# Patient Record
Sex: MALE | Race: WHITE | Employment: FULL TIME | URBAN - METROPOLITAN AREA
[De-identification: names, ages, dates, MRNs, and addresses within clinical notes are randomized per-mention and may not be internally consistent; named-entity substitution may affect disease eponyms.]

---

## 2021-05-12 ENCOUNTER — APPOINTMENT (OUTPATIENT)
Dept: GENERAL RADIOLOGY | Age: 51
End: 2021-05-12
Payer: COMMERCIAL

## 2021-05-12 ENCOUNTER — HOSPITAL ENCOUNTER (OUTPATIENT)
Age: 51
Setting detail: OBSERVATION
Discharge: HOME | End: 2021-05-13
Attending: EMERGENCY MEDICINE | Admitting: INTERNAL MEDICINE
Payer: COMMERCIAL

## 2021-05-12 DIAGNOSIS — R07.9 CHEST PAIN, UNSPECIFIED TYPE: Primary | ICD-10-CM

## 2021-05-12 DIAGNOSIS — R51.9 NONINTRACTABLE HEADACHE, UNSPECIFIED CHRONICITY PATTERN, UNSPECIFIED HEADACHE TYPE: ICD-10-CM

## 2021-05-12 DIAGNOSIS — R06.02 SHORTNESS OF BREATH: ICD-10-CM

## 2021-05-12 PROBLEM — E66.9 OBESITY (BMI 30-39.9): Status: ACTIVE | Noted: 2021-05-12

## 2021-05-12 LAB
A/G RATIO: 1.1 (ref 1.1–2.2)
ALBUMIN SERPL-MCNC: 3.9 G/DL (ref 3.4–5)
ALP BLD-CCNC: 66 U/L (ref 40–129)
ALT SERPL-CCNC: 25 U/L (ref 10–40)
ANION GAP SERPL CALCULATED.3IONS-SCNC: 8 MMOL/L (ref 3–16)
AST SERPL-CCNC: 24 U/L (ref 15–37)
BASOPHILS ABSOLUTE: 0.1 K/UL (ref 0–0.2)
BASOPHILS RELATIVE PERCENT: 0.6 %
BILIRUB SERPL-MCNC: 0.5 MG/DL (ref 0–1)
BUN BLDV-MCNC: 11 MG/DL (ref 7–20)
CALCIUM SERPL-MCNC: 9.1 MG/DL (ref 8.3–10.6)
CHLORIDE BLD-SCNC: 101 MMOL/L (ref 99–110)
CO2: 28 MMOL/L (ref 21–32)
CREAT SERPL-MCNC: 0.8 MG/DL (ref 0.9–1.3)
EKG ATRIAL RATE: 76 BPM
EKG DIAGNOSIS: NORMAL
EKG P AXIS: 51 DEGREES
EKG P-R INTERVAL: 158 MS
EKG Q-T INTERVAL: 370 MS
EKG QRS DURATION: 98 MS
EKG QTC CALCULATION (BAZETT): 416 MS
EKG R AXIS: -24 DEGREES
EKG T AXIS: 36 DEGREES
EKG VENTRICULAR RATE: 76 BPM
EOSINOPHILS ABSOLUTE: 0.2 K/UL (ref 0–0.6)
EOSINOPHILS RELATIVE PERCENT: 2.6 %
GFR AFRICAN AMERICAN: >60
GFR NON-AFRICAN AMERICAN: >60
GLOBULIN: 3.5 G/DL
GLUCOSE BLD-MCNC: 90 MG/DL (ref 70–99)
HCT VFR BLD CALC: 40 % (ref 40.5–52.5)
HEMOGLOBIN: 13.4 G/DL (ref 13.5–17.5)
LYMPHOCYTES ABSOLUTE: 3 K/UL (ref 1–5.1)
LYMPHOCYTES RELATIVE PERCENT: 33.3 %
MCH RBC QN AUTO: 29.2 PG (ref 26–34)
MCHC RBC AUTO-ENTMCNC: 33.5 G/DL (ref 31–36)
MCV RBC AUTO: 87.2 FL (ref 80–100)
MONOCYTES ABSOLUTE: 0.8 K/UL (ref 0–1.3)
MONOCYTES RELATIVE PERCENT: 9.1 %
NEUTROPHILS ABSOLUTE: 4.9 K/UL (ref 1.7–7.7)
NEUTROPHILS RELATIVE PERCENT: 54.4 %
PDW BLD-RTO: 13.4 % (ref 12.4–15.4)
PLATELET # BLD: 188 K/UL (ref 135–450)
PMV BLD AUTO: 10.8 FL (ref 5–10.5)
POTASSIUM SERPL-SCNC: 3.9 MMOL/L (ref 3.5–5.1)
RBC # BLD: 4.59 M/UL (ref 4.2–5.9)
SODIUM BLD-SCNC: 137 MMOL/L (ref 136–145)
TOTAL PROTEIN: 7.4 G/DL (ref 6.4–8.2)
TROPONIN: <0.01 NG/ML
TROPONIN: <0.01 NG/ML
WBC # BLD: 8.9 K/UL (ref 4–11)

## 2021-05-12 PROCEDURE — 84484 ASSAY OF TROPONIN QUANT: CPT

## 2021-05-12 PROCEDURE — G0378 HOSPITAL OBSERVATION PER HR: HCPCS

## 2021-05-12 PROCEDURE — 71045 X-RAY EXAM CHEST 1 VIEW: CPT

## 2021-05-12 PROCEDURE — 6370000000 HC RX 637 (ALT 250 FOR IP): Performed by: NURSE PRACTITIONER

## 2021-05-12 PROCEDURE — 99285 EMERGENCY DEPT VISIT HI MDM: CPT

## 2021-05-12 PROCEDURE — 6370000000 HC RX 637 (ALT 250 FOR IP): Performed by: INTERNAL MEDICINE

## 2021-05-12 PROCEDURE — 80053 COMPREHEN METABOLIC PANEL: CPT

## 2021-05-12 PROCEDURE — 93010 ELECTROCARDIOGRAM REPORT: CPT | Performed by: INTERNAL MEDICINE

## 2021-05-12 PROCEDURE — 93005 ELECTROCARDIOGRAM TRACING: CPT | Performed by: EMERGENCY MEDICINE

## 2021-05-12 PROCEDURE — 85025 COMPLETE CBC W/AUTO DIFF WBC: CPT

## 2021-05-12 RX ORDER — ONDANSETRON 2 MG/ML
4 INJECTION INTRAMUSCULAR; INTRAVENOUS EVERY 30 MIN PRN
Status: DISCONTINUED | OUTPATIENT
Start: 2021-05-12 | End: 2021-05-12

## 2021-05-12 RX ORDER — SODIUM CHLORIDE 9 MG/ML
25 INJECTION, SOLUTION INTRAVENOUS PRN
Status: DISCONTINUED | OUTPATIENT
Start: 2021-05-12 | End: 2021-05-13 | Stop reason: HOSPADM

## 2021-05-12 RX ORDER — ACETAMINOPHEN 500 MG
1000 TABLET ORAL ONCE
Status: COMPLETED | OUTPATIENT
Start: 2021-05-12 | End: 2021-05-12

## 2021-05-12 RX ORDER — POLYETHYLENE GLYCOL 3350 17 G/17G
17 POWDER, FOR SOLUTION ORAL DAILY PRN
Status: DISCONTINUED | OUTPATIENT
Start: 2021-05-12 | End: 2021-05-13 | Stop reason: HOSPADM

## 2021-05-12 RX ORDER — SODIUM CHLORIDE 0.9 % (FLUSH) 0.9 %
5-40 SYRINGE (ML) INJECTION EVERY 12 HOURS SCHEDULED
Status: DISCONTINUED | OUTPATIENT
Start: 2021-05-12 | End: 2021-05-13 | Stop reason: HOSPADM

## 2021-05-12 RX ORDER — ONDANSETRON 2 MG/ML
4 INJECTION INTRAMUSCULAR; INTRAVENOUS EVERY 6 HOURS PRN
Status: DISCONTINUED | OUTPATIENT
Start: 2021-05-12 | End: 2021-05-13 | Stop reason: HOSPADM

## 2021-05-12 RX ORDER — SODIUM CHLORIDE 0.9 % (FLUSH) 0.9 %
5-40 SYRINGE (ML) INJECTION PRN
Status: DISCONTINUED | OUTPATIENT
Start: 2021-05-12 | End: 2021-05-13 | Stop reason: HOSPADM

## 2021-05-12 RX ORDER — ACETAMINOPHEN 325 MG/1
650 TABLET ORAL EVERY 6 HOURS PRN
Status: DISCONTINUED | OUTPATIENT
Start: 2021-05-12 | End: 2021-05-13 | Stop reason: HOSPADM

## 2021-05-12 RX ORDER — LISINOPRIL 10 MG/1
10 TABLET ORAL DAILY
Status: DISCONTINUED | OUTPATIENT
Start: 2021-05-13 | End: 2021-05-13 | Stop reason: HOSPADM

## 2021-05-12 RX ORDER — NITROGLYCERIN 0.4 MG/1
0.4 TABLET SUBLINGUAL EVERY 5 MIN PRN
Status: DISCONTINUED | OUTPATIENT
Start: 2021-05-12 | End: 2021-05-13 | Stop reason: HOSPADM

## 2021-05-12 RX ORDER — PROMETHAZINE HYDROCHLORIDE 25 MG/1
12.5 TABLET ORAL EVERY 6 HOURS PRN
Status: DISCONTINUED | OUTPATIENT
Start: 2021-05-12 | End: 2021-05-13 | Stop reason: HOSPADM

## 2021-05-12 RX ORDER — ACETAMINOPHEN 650 MG/1
650 SUPPOSITORY RECTAL EVERY 6 HOURS PRN
Status: DISCONTINUED | OUTPATIENT
Start: 2021-05-12 | End: 2021-05-13 | Stop reason: HOSPADM

## 2021-05-12 RX ORDER — ASPIRIN 81 MG/1
81 TABLET ORAL DAILY
Status: DISCONTINUED | OUTPATIENT
Start: 2021-05-13 | End: 2021-05-13 | Stop reason: HOSPADM

## 2021-05-12 RX ORDER — PRAVASTATIN SODIUM 20 MG
20 TABLET ORAL DAILY
COMMUNITY

## 2021-05-12 RX ORDER — ATORVASTATIN CALCIUM 40 MG/1
40 TABLET, FILM COATED ORAL NIGHTLY
Status: DISCONTINUED | OUTPATIENT
Start: 2021-05-12 | End: 2021-05-13 | Stop reason: HOSPADM

## 2021-05-12 RX ADMIN — ACETAMINOPHEN 650 MG: 325 TABLET ORAL at 23:19

## 2021-05-12 RX ADMIN — NITROGLYCERIN 1 INCH: 20 OINTMENT TOPICAL at 17:40

## 2021-05-12 RX ADMIN — ACETAMINOPHEN 1000 MG: 500 TABLET ORAL at 17:40

## 2021-05-12 RX ADMIN — ATORVASTATIN CALCIUM 40 MG: 40 TABLET, FILM COATED ORAL at 21:43

## 2021-05-12 ASSESSMENT — PAIN DESCRIPTION - ORIENTATION: ORIENTATION: MID

## 2021-05-12 ASSESSMENT — PAIN DESCRIPTION - DESCRIPTORS: DESCRIPTORS: PRESSURE

## 2021-05-12 ASSESSMENT — PAIN DESCRIPTION - LOCATION: LOCATION: CHEST

## 2021-05-12 ASSESSMENT — PAIN SCALES - GENERAL
PAINLEVEL_OUTOF10: 0
PAINLEVEL_OUTOF10: 2

## 2021-05-12 ASSESSMENT — PAIN DESCRIPTION - PAIN TYPE: TYPE: ACUTE PAIN

## 2021-05-12 NOTE — ED PROVIDER NOTES
Evaluated by [id number] [facility] Provider    [address] [facility]  ED    CHIEF COMPLAINT  Chest Pain (patient states that was having some chest pain, mid sternal with radiating into the back. Patient was 6/10, now 2/10 after 2 nitro and 3 81mg aspirin (pt takes 81mg daily). Pt with hx of HTN)    HISTORY OF PRESENT ILLNESS  Amrik Pichardo is a 54 y.o. male who presents to the ED complaining of CP. Center to the left, radiated in to the back. HA, /72. EKG showed ST depression and some elevations, repeat EKG still showing changes and advised to come to ER. Had a heart cath about 11-12 years ago-enlarged heart. Has follow up with cardiology in 2 weeks because he is having BP issues again, he is on lisinopril again. Today there was a code on a OD at 5:45 am, after this is when he noticed he wasn't feeling good. Approximate onset of pain was around 10:30 am. He felt wore out and weak at first. Pain came out of the blue, not to sudden. He tried to eat, not feeling good and not getting any better. Checked BP and it was 170/102. He did have a dull ache in his left shoulder at one point. No further radiation of the pain. Reports associated SOB, little light headed/dizzy, HA, balance off a little. Denies associated nausea, vomiting, diaphoresis. Pain has not gone completely away since it started. Prior to coming here it was up to a 8/10. Denies aggravating factors, denies alleviating factors for the pain. The patient is currently rating their pain as 2/10 and describes it as a dull discomfort type of pain. Risk factors include: Possible mild MI about 13 years, cath showed no blockages but enlarged heart. No further testing since then. +Obesity, +HTN, +HLD, -DM, -smoker, -FH. The patient denies history of a blood clot in the past.   The patient denies history of surgery, travel, or trauma in the past 4 weeks. The patient denies cough. The patient does not take estrogen supplements.    There is no unilateral leg swelling. Treatments tried prior to arrival in the ED include: 2 NTG decreased pain from 6 to a 2 and helped his BP. He did take 1 81 mg ASA this morning and was given 3 more in the squad. The patient arrived to the ED via EMS transport. PAST MEDICAL HISTORY    Past Medical History:   Diagnosis Date    Enlarged heart     by cath 2011//dur to atenolol 25???//    Hypertension        SURGICAL HISTORY    Past Surgical History:   Procedure Laterality Date    HERNIA REPAIR      SHOULDER ARTHROSCOPY         CURRENT MEDICATIONS    Current Outpatient Rx   Medication Sig Dispense Refill    ibuprofen (ADVIL;MOTRIN) 600 MG tablet Take 600 mg by mouth every 6 hours as needed for Pain.  Naproxen Sodium (ALEVE) 220 MG CAPS Take 220 mg by mouth.  lisinopril (PRINIVIL;ZESTRIL) 10 MG tablet Take 1 tablet by mouth daily. 30 tablet 5    nitroGLYCERIN (NITROSTAT) 0.4 MG SL tablet Place 1 tablet under the tongue every 5 minutes as needed for Chest pain (may repeat 2 times only). 25 tablet 3    aspirin EC 81 MG EC tablet Take 1 tablet by mouth daily. 30 tablet 3       ALLERGIES    Allergies   Allergen Reactions    Atenolol Other (See Comments)     Enlarged heart//????    Hctz [Hydrochlorothiazide] Other (See Comments)     Enlarged heart with bp med//2011???       FAMILY HISTORY    No family history on file.     SOCIAL HISTORY    Social History     Socioeconomic History    Marital status:      Spouse name: Not on file    Number of children: Not on file    Years of education: Not on file    Highest education level: Not on file   Occupational History    Not on file   Social Needs    Financial resource strain: Not on file    Food insecurity     Worry: Not on file     Inability: Not on file    Transportation needs     Medical: Not on file     Non-medical: Not on file   Tobacco Use    Smoking status: Never Smoker    Smokeless tobacco: Never Used   Substance and Sexual Activity    Alcohol use: Yes     Comment: occasional, Very rare.  Drug use: No    Sexual activity: Not on file   Lifestyle    Physical activity     Days per week: Not on file     Minutes per session: Not on file    Stress: Not on file   Relationships    Social connections     Talks on phone: Not on file     Gets together: Not on file     Attends Buddhism service: Not on file     Active member of club or organization: Not on file     Attends meetings of clubs or organizations: Not on file     Relationship status: Not on file    Intimate partner violence     Fear of current or ex partner: Not on file     Emotionally abused: Not on file     Physically abused: Not on file     Forced sexual activity: Not on file   Other Topics Concern    Not on file   Social History Narrative    2/2013 lives with edna and his mother;works [employer];       REVIEW OF SYSTEMS    10 systems reviewed, pertinent positives per HPI otherwise noted to be negative    PHYSICAL EXAM  Vitals:    05/12/21 1630   BP: 136/86   Pulse: 81   Resp: 14   Temp: 98.2 °F (36.8 °C)   SpO2: 97%     GENERAL: Patient is well-developed, obese. Awake and alert. Cooperative. Resting in bed. No apparent distress. HEENT:  Normocephalic, atraumatic. Conjunctiva appear normal. Sclera is non-icteric. External ears are normal.    NECK: Supple with normal ROM. Trachea midline. LUNGS: Equal and symmetric chest rise. Breathing is unlabored. Speaking comfortably in full sentences. Lungs are clear bilaterally to auscultation. Without wheezing, rales, or rhonchi. CADIOVASCULAR:  Regular rate and rhythm. Normal S1-S2 sounds. No murmurs, rubs, or gallops. Capillary refill is brisk in all 4 extremities. Bilateral lower extremities are equal in size, there is no swelling observed. There is no tenderness to palpation. There is no erythema observed or warmth palpated. GI: Soft, nontender, nondistended with positive bowel sounds.   No rebound tenderness, guarding or any peritoneal signs. No masses or hepatosplenomegaly    MUSCULOSKELETAL:  No gross deformities or trauma noted. Moving all extremities equally and appropriately. Normal ROM. SKIN: Warm/dry. Skin is intact. No rashes/lesions noted. PSYCHIATRIC: Mood and affect appropriate. Speech is clear and articulate. NEUROLOGIC: Alert and oriented. No focal motor or sensory deficits. LABS  I have reviewed all labs for this visit.    Results for orders placed or performed during the hospital encounter of 05/12/21   CBC auto differential   Result Value Ref Range    WBC 8.9 4.0 - 11.0 K/uL    RBC 4.59 4.20 - 5.90 M/uL    Hemoglobin 13.4 (L) 13.5 - 17.5 g/dL    Hematocrit 40.0 (L) 40.5 - 52.5 %    MCV 87.2 80.0 - 100.0 fL    MCH 29.2 26.0 - 34.0 pg    MCHC 33.5 31.0 - 36.0 g/dL    RDW 13.4 12.4 - 15.4 %    Platelets 050 180 - 049 K/uL    MPV 10.8 (H) 5.0 - 10.5 fL    Neutrophils % 54.4 %    Lymphocytes % 33.3 %    Monocytes % 9.1 %    Eosinophils % 2.6 %    Basophils % 0.6 %    Neutrophils Absolute 4.9 1.7 - 7.7 K/uL    Lymphocytes Absolute 3.0 1.0 - 5.1 K/uL    Monocytes Absolute 0.8 0.0 - 1.3 K/uL    Eosinophils Absolute 0.2 0.0 - 0.6 K/uL    Basophils Absolute 0.1 0.0 - 0.2 K/uL   Comprehensive metabolic panel   Result Value Ref Range    Sodium 137 136 - 145 mmol/L    Potassium 3.9 3.5 - 5.1 mmol/L    Chloride 101 99 - 110 mmol/L    CO2 28 21 - 32 mmol/L    Anion Gap 8 3 - 16    Glucose 90 70 - 99 mg/dL    BUN 11 7 - 20 mg/dL    CREATININE 0.8 (L) 0.9 - 1.3 mg/dL    GFR Non-African American >60 >60    GFR African American >60 >60    Calcium 9.1 8.3 - 10.6 mg/dL    Total Protein 7.4 6.4 - 8.2 g/dL    Albumin 3.9 3.4 - 5.0 g/dL    Albumin/Globulin Ratio 1.1 1.1 - 2.2    Total Bilirubin 0.5 0.0 - 1.0 mg/dL    Alkaline Phosphatase 66 40 - 129 U/L    ALT 25 10 - 40 U/L    AST 24 15 - 37 U/L    Globulin 3.5 g/dL   Troponin   Result Value Ref Range    Troponin <0.01 <0.01 ng/mL   EKG 12 Lead   Result Value Ref Range    Ventricular Rate 76 BPM    Atrial Rate 76 BPM    P-R Interval 158 ms    QRS Duration 98 ms    Q-T Interval 370 ms    QTc Calculation (Bazett) 416 ms    P Axis 51 degrees    R Axis -24 degrees    T Axis 36 degrees    Diagnosis       Normal sinus rhythmMinimal voltage criteria for LVH, may be normal variantBorderline ECGWhen compared with ECG of 08-NOV-2013 14:43,No significant change was found       RADIOLOGY    Xr Chest Portable    Result Date: 5/12/2021  EXAMINATION: ONE XRAY VIEW OF THE CHEST 5/12/2021 4:46 pm COMPARISON: 11/08/2013 HISTORY: ORDERING SYSTEM PROVIDED HISTORY: chest pain TECHNOLOGIST PROVIDED HISTORY: Reason for exam:->chest pain Reason for Exam: cp sob Acuity: Acute Type of Exam: Initial FINDINGS: Lung volumes are low accentuating heart size and bronchovascular markings at the lung bases. Patient body habitus limits evaluation of fine pulmonary parenchymal changes. Heart size is normal.  Mediastinal contours are normal. Pulmonary vascularity is normal.  No focal lung consolidation. No acute disease     PERC Rule:  Applicable in this patient who has low clinical suspicion for pulmonary embolism. Age < 48years old: Yes  Heart rate < 100 bpm: Yes  Oxygen saturation > 95%: Yes  Hemoptysis: No  Exogenous estrogen use: No  Prior history of DVT or PE: No  Unilateral leg swelling: No  Surgery or significant trauma in the past 4 weeks: No    Based on the above, PE can effectively be ruled out without further testing. PERC RuleThe patient meets all PERC criteria and has a low-risk Well's score, indicating very low risk of PE. The risks of further investigation, including a large dose of radiation and/or unnecessary treatment with anticoagulant therapy, outweigh the risk of a clinically significant PE. Thus, neither a D-dimer nor a CTA of the pulmonary arteries are indicated.     HEART SCORE:    History: +1 for moderate suspicion  EKG: +1 for nonspecific changes   Age: +1 for age 44-72 years  Risk factors (includes HLD, HTN, DM, tobacco use, obesity, and +FHx): +2 for known CAD or 3+ risk factors  Initial troponin: +0 for negative troponin    Heart score: 5. This falls under the following category: Score of 4-6, which indicates low/moderate risk for major adverse cardiac event and supports observation with repeated troponins and/or non-invasive testing  Score 4-6 12-16.6% risk of MACE. In the HEART Score study, these patients were admitted to hospital.     ED COURSE/MDM  Patient seen and evaluated. Old records reviewed. Diagnostic testing reviewed and results discussed. I have seen and evaluated this patient with supervising physician: Prasanth Rogers MD. We thoroughly discussed the history, physical exam, diagnostic testing, emergency department course, plan and disposition. Coco Wilson presented to the ED today with above noted complaints. Physical exam without adventitious breath sounds on exam.  Patient is afebrile and hemodynamically stable. He is well saturated on room air. He is continuing to report chest pain of a 2/10 that is a discomfort. Patient has taken a total of 324 mg of aspirin prior to arriving in the ER and was given 2 nitro. I will apply Nitropaste to the chest wall to see if this will help further diminish his pain. Blood work is without evidence of systemic infection. Stable anemia. No electrolyte abnormality. No evidence of acute kidney injury or transaminitis. Troponin is negative. Chest x-ray is without acute findings. EKG is without acute findings, minimal voltage criteria for LVH may be normal variant. Borderline EKG when compared with prior. Patient has a heart score of 5 and I do feel his story is concerning for a possible ACS and I feel he will require admission for further evaluation and management.     Pt was given the following medications or treatments in the ED:   Medications   ondansetron (ZOFRAN) injection 4 mg (has no administration in time range)   nitroglycerin (NITRO-BID) 2 % ointment 1 inch (1 inch Topical Given 5/12/21 1740)   acetaminophen (TYLENOL) tablet 1,000 mg (1,000 mg Oral Given 5/12/21 1740)     Donnie Rios will be admitted for further observation and evaluation of Aly Batres's chest pain. As I have deemed necessary from their history, physical, and studies, I have considered and evaluated Donnie Rios for the following diagnoses:  ACUTE CORONARY SYNDROME, PERICARDIAL TAMPONADE, PNEUMOTHORAX, PULMONARY EMBOLISM, and THORACIC DISSECTION. CLINICAL IMPRESSION    1. Chest pain, unspecified type    2. Nonintractable headache, unspecified chronicity pattern, unspecified headache type    3. Shortness of breath       DISPOSITION  Admit. Comment: Please note this report has been produced using speech recognition software and may contain errors related to that system including errors in grammar, punctuation, and spelling, as well as words and phrases that may be inappropriate. If there are any questions or concerns please feel free to contact the dictating provider for clarification.         PAULINE Bob - MELO  05/12/21 3722

## 2021-05-12 NOTE — ED PROVIDER NOTES
EKG interpretation:  Normal sinus rhythm, rate 76, left axis deviation, QTC within normal limits, no acute ST segment changes or T wave inversions compared with prior dated 11/8/2013     Scar Smiley MD  05/12/21 1815    I independently performed a history and physical on Lapatriciaie Feast. All diagnostic, treatment, and disposition decisions were made by myself in conjunction with the advanced practice provider. Initial work-up unremarkable however patient with a story suggestive of exertional chest pain with multiple risk factors and a heart score of 5. No recent cardiac work-up. Plan for hospitalist admission for stress test and echo. For further details of [facility] emergency department encounter, please see NILDA Landaverde's documentation.      Scar Smiley MD  05/12/21 2025

## 2021-05-12 NOTE — H&P
Hospital Medicine History & Physical      Patient:  Anastacio Ramirez  :   1970  MRN:   [id number]  Date of Service: 21    CHIEF COMPLAINT:  Chest Pain (patient states that was having some chest pain, mid sternal with radiating into the back. Patient was 6/10, now 2/10 after 2 nitro and 3 81mg aspirin (pt takes 81mg daily). Pt with hx of HTN)     HISTORY OF PRESENT ILLNESS:   Anastacio Ramirez is a 54 y.o. male. Patient is a basic EMT. Today there was a code on a OD at 5:45 am, after this around 9-10am he began feeling generally unwell and also describes feeling a \"weight\" sensation on his chest.  He also developed a headache. He thought maybe he felt poorly because he had not yet eaten, so he did go to eat but this didn't change his symptoms. Thereafter he went to a fire station, had a BP check and EKG. He was found to be unusually hypertensive with SBP in the 170s-180s. He relates there were EKG abnormalities but can't specifically tell about these in detail. On the way to the ER he was given three SL NTG in series and each one reduced the discomfort within 2 minutes but pain had not completely resolved at the time of arrival.  Nitropaste was thus applied in the ER.    ~11 years ago he was evaluated for severe chest pain in [place], [place]. He underwent LHC. His coronaries were normal but he was told his heart was enlarged. Review of Systems:  All pertinent positives and negatives are as noted in the HPI section. All other systems were reviewed and are negative. Past Medical History:   Diagnosis Date    Enlarged heart     by cath //dur to atenolol 25???//    Hypertension        Past Surgical History:   Procedure Laterality Date    HERNIA REPAIR      SHOULDER ARTHROSCOPY           Prior to Admission medications    Medication Sig Start Date End Date Taking? Authorizing Provider   ibuprofen (ADVIL;MOTRIN) 600 MG tablet Take 600 mg by mouth every 6 hours as needed for Pain. Yes Historical Provider, MD   Naproxen Sodium (ALEVE) 220 MG CAPS Take 220 mg by mouth. Yes Historical Provider, MD   lisinopril (PRINIVIL;ZESTRIL) 10 MG tablet Take 1 tablet by mouth daily. 6/5/13  Yes Michelle Cardoza MD   nitroGLYCERIN (NITROSTAT) 0.4 MG SL tablet Place 1 tablet under the tongue every 5 minutes as needed for Chest pain (may repeat 2 times only). 2/19/13  Yes Michelle Cardoza MD   aspirin EC 81 MG EC tablet Take 1 tablet by mouth daily. 2/19/13  Yes Michelle Cardoza MD       Allergies:   Atenolol and Hctz [hydrochlorothiazide]    Social:   reports that he has never smoked. He has never used smokeless tobacco.   reports current alcohol use. Social History     Substance and Sexual Activity   Drug Use No       FH  No 1st degree relatives with early MI or SCD    PHYSICAL EXAM:  I performed this physical examination. Vitals:  Patient Vitals for the past 24 hrs:   BP Temp Temp src Pulse Resp SpO2 Height Weight   05/12/21 1915 125/83 -- -- 76 19 94 % -- --   05/12/21 1846 134/78 -- -- 68 16 96 % -- --   05/12/21 1816 126/82 -- -- 76 13 95 % -- --   05/12/21 1746 (!) 147/88 -- -- 77 26 93 % -- --   05/12/21 1739 (!) 151/101 -- -- 75 16 98 % -- --   05/12/21 1630 136/86 98.2 °F (36.8 °C) Oral 81 14 97 % 5' 10\" (1.778 m) 269 lb (122 kg)     GEN:  Appearance:  Age appropriate male in NAD . Level of Consciousness:  alert . Orientation:  full    HEENT: Sclera anicteric.  no conjunctival chemosis. moist mucus membranes. no specific or diagnostic oral lesions. NECK:  no signs of meningismus. Jugular veins not distended. Carotid pulses  2+.  no cervical lymphadenopathy. no thyromegaly. CV:  regular rhythm. normal S1 & S2.    no murmur. no rub.  no gallop. PULM:  Chest excursion is symmetric. Breath sounds are vesicular. Adventitious sounds:  none    AB:  Abdominal shape is normal.  Bowel sounds are active. Generally soft to palpation. no tenderness is present. 05/12/2021    Hypertension [I10]        ASSESSMENT & PLAN  Chest Pain  -  1-2 features of typical cardiac angina in a male age 50. Intermediate pre-test probability for high-risk CAD. Will proceed with exercise/MPI stress test in the morning assuming overnight troponins remain undetectable. -  Continue home ASA 81mg daily. Start atorvastatin tonight. HTN  -  Continue lisinopril. DVT prophylaxis: SCDs, lovenox  Code Status:  Full  Disposition:  Observation. Anticipate d/c to home in 1-2 days.     Renu Alicia MD

## 2021-05-13 ENCOUNTER — APPOINTMENT (OUTPATIENT)
Dept: NUCLEAR MEDICINE | Age: 51
End: 2021-05-13
Payer: COMMERCIAL

## 2021-05-13 VITALS
HEART RATE: 75 BPM | SYSTOLIC BLOOD PRESSURE: 153 MMHG | OXYGEN SATURATION: 98 % | HEIGHT: 70 IN | WEIGHT: 269 LBS | RESPIRATION RATE: 18 BRPM | DIASTOLIC BLOOD PRESSURE: 90 MMHG | TEMPERATURE: 97.8 F | BODY MASS INDEX: 38.51 KG/M2

## 2021-05-13 LAB
ANION GAP SERPL CALCULATED.3IONS-SCNC: 6 MMOL/L (ref 3–16)
BUN BLDV-MCNC: 12 MG/DL (ref 7–20)
CALCIUM SERPL-MCNC: 9 MG/DL (ref 8.3–10.6)
CHLORIDE BLD-SCNC: 104 MMOL/L (ref 99–110)
CHOLESTEROL, TOTAL: 167 MG/DL (ref 0–199)
CO2: 28 MMOL/L (ref 21–32)
CREAT SERPL-MCNC: 0.8 MG/DL (ref 0.9–1.3)
EKG ATRIAL RATE: 59 BPM
EKG DIAGNOSIS: NORMAL
EKG P AXIS: 59 DEGREES
EKG P-R INTERVAL: 156 MS
EKG Q-T INTERVAL: 414 MS
EKG QRS DURATION: 96 MS
EKG QTC CALCULATION (BAZETT): 409 MS
EKG R AXIS: -16 DEGREES
EKG T AXIS: 19 DEGREES
EKG VENTRICULAR RATE: 59 BPM
ESTIMATED AVERAGE GLUCOSE: 108.3 MG/DL
GFR AFRICAN AMERICAN: >60
GFR NON-AFRICAN AMERICAN: >60
GLUCOSE BLD-MCNC: 95 MG/DL (ref 70–99)
HBA1C MFR BLD: 5.4 %
HCT VFR BLD CALC: 39.8 % (ref 40.5–52.5)
HDLC SERPL-MCNC: 31 MG/DL (ref 40–60)
HEMOGLOBIN: 13.2 G/DL (ref 13.5–17.5)
LDL CHOLESTEROL CALCULATED: 97 MG/DL
LV EF: 63 %
LVEF MODALITY: NORMAL
MCH RBC QN AUTO: 28.9 PG (ref 26–34)
MCHC RBC AUTO-ENTMCNC: 33.1 G/DL (ref 31–36)
MCV RBC AUTO: 87.3 FL (ref 80–100)
PDW BLD-RTO: 13.3 % (ref 12.4–15.4)
PLATELET # BLD: 174 K/UL (ref 135–450)
PMV BLD AUTO: 10.3 FL (ref 5–10.5)
POTASSIUM REFLEX MAGNESIUM: 4 MMOL/L (ref 3.5–5.1)
RBC # BLD: 4.56 M/UL (ref 4.2–5.9)
SODIUM BLD-SCNC: 138 MMOL/L (ref 136–145)
TRIGL SERPL-MCNC: 194 MG/DL (ref 0–150)
TROPONIN: <0.01 NG/ML
VLDLC SERPL CALC-MCNC: 39 MG/DL
WBC # BLD: 9.7 K/UL (ref 4–11)

## 2021-05-13 PROCEDURE — 93005 ELECTROCARDIOGRAM TRACING: CPT | Performed by: INTERNAL MEDICINE

## 2021-05-13 PROCEDURE — 80048 BASIC METABOLIC PNL TOTAL CA: CPT

## 2021-05-13 PROCEDURE — 2700000000 HC OXYGEN THERAPY PER DAY

## 2021-05-13 PROCEDURE — 3430000000 HC RX DIAGNOSTIC RADIOPHARMACEUTICAL: Performed by: INTERNAL MEDICINE

## 2021-05-13 PROCEDURE — 78452 HT MUSCLE IMAGE SPECT MULT: CPT

## 2021-05-13 PROCEDURE — A9502 TC99M TETROFOSMIN: HCPCS | Performed by: INTERNAL MEDICINE

## 2021-05-13 PROCEDURE — G0378 HOSPITAL OBSERVATION PER HR: HCPCS

## 2021-05-13 PROCEDURE — 80061 LIPID PANEL: CPT

## 2021-05-13 PROCEDURE — 93017 CV STRESS TEST TRACING ONLY: CPT

## 2021-05-13 PROCEDURE — 6370000000 HC RX 637 (ALT 250 FOR IP): Performed by: INTERNAL MEDICINE

## 2021-05-13 PROCEDURE — 83036 HEMOGLOBIN GLYCOSYLATED A1C: CPT

## 2021-05-13 PROCEDURE — 93010 ELECTROCARDIOGRAM REPORT: CPT | Performed by: INTERNAL MEDICINE

## 2021-05-13 PROCEDURE — 36415 COLL VENOUS BLD VENIPUNCTURE: CPT

## 2021-05-13 PROCEDURE — 94761 N-INVAS EAR/PLS OXIMETRY MLT: CPT

## 2021-05-13 PROCEDURE — 85027 COMPLETE CBC AUTOMATED: CPT

## 2021-05-13 PROCEDURE — 84484 ASSAY OF TROPONIN QUANT: CPT

## 2021-05-13 PROCEDURE — 2580000003 HC RX 258: Performed by: INTERNAL MEDICINE

## 2021-05-13 PROCEDURE — 94660 CPAP INITIATION&MGMT: CPT

## 2021-05-13 RX ADMIN — ACETAMINOPHEN 650 MG: 325 TABLET ORAL at 05:48

## 2021-05-13 RX ADMIN — Medication 10 ML: at 08:26

## 2021-05-13 RX ADMIN — TETROFOSMIN 32.8 MILLICURIE: 1.38 INJECTION, POWDER, LYOPHILIZED, FOR SOLUTION INTRAVENOUS at 15:14

## 2021-05-13 RX ADMIN — TETROFOSMIN 10.4 MILLICURIE: 1.38 INJECTION, POWDER, LYOPHILIZED, FOR SOLUTION INTRAVENOUS at 13:08

## 2021-05-13 RX ADMIN — ASPIRIN 81 MG: 81 TABLET, COATED ORAL at 08:26

## 2021-05-13 RX ADMIN — LISINOPRIL 10 MG: 10 TABLET ORAL at 08:26

## 2021-05-13 ASSESSMENT — PAIN SCALES - GENERAL: PAINLEVEL_OUTOF10: 2

## 2021-05-13 ASSESSMENT — PAIN DESCRIPTION - PAIN TYPE: TYPE: ACUTE PAIN

## 2021-05-13 ASSESSMENT — PAIN DESCRIPTION - DESCRIPTORS: DESCRIPTORS: DISCOMFORT

## 2021-05-13 ASSESSMENT — PAIN DESCRIPTION - ORIENTATION: ORIENTATION: MID

## 2021-05-13 NOTE — PROGRESS NOTES
Hospitalist Progress Note      PCP: Jes Scherer MD    Date of Admission: 5/12/2021        Subjective:     Feels better. Has no chest pain this morning. . Awaiting to have stress test which apparently may not be done until tomorrow      Medications:  Reviewed    Infusion Medications    sodium chloride       Scheduled Medications    aspirin EC  81 mg Oral Daily    lisinopril  10 mg Oral Daily    sodium chloride flush  5-40 mL Intravenous 2 times per day    atorvastatin  40 mg Oral Nightly    enoxaparin  40 mg Subcutaneous Daily     PRN Meds: sodium chloride flush, sodium chloride, promethazine **OR** ondansetron, acetaminophen **OR** acetaminophen, polyethylene glycol, nitroGLYCERIN      Intake/Output Summary (Last 24 hours) at 5/13/2021 1438  Last data filed at 5/12/2021 2026  Gross per 24 hour   Intake 360 ml   Output --   Net 360 ml       Exam:    BP (!) 144/91   Pulse 69   Temp 97.6 °F (36.4 °C) (Oral)   Resp 17   Ht 5' 10\" (1.778 m)   Wt 269 lb (122 kg)   SpO2 96%   BMI 38.60 kg/m²     General appearance: No apparent distress, appears stated age and cooperative. HEENT: Pupils equal, round, and reactive to light. Conjunctivae/corneas clear. Neck: Supple, with full range of motion. No jugular venous distention. Trachea midline. Respiratory:  Normal respiratory effort. Clear to auscultation, bilaterally without Rales/Wheezes/Rhonchi. Cardiovascular: Regular rate and rhythm with normal S1/S2 without murmurs, rubs or gallops. Abdomen: Soft, non-tender, non-distended with normal bowel sounds. Musculoskeletal: No clubbing, cyanosis or edema bilaterally. Full range of motion without deformity. Skin: Skin color, texture, turgor normal.  No rashes or lesions. Neurologic:  Neurovascularly intact without any focal sensory/motor deficits.  Cranial nerves: II-XII intact, grossly non-focal.  Psychiatric: Alert and oriented, thought content appropriate, normal insight  Capillary Refill: Brisk,< 3 seconds   Peripheral Pulses: +2 palpable, equal bilaterally       Labs:   Recent Labs     05/12/21  1630 05/13/21  0740   WBC 8.9 9.7   HGB 13.4* 13.2*   HCT 40.0* 39.8*    174     Recent Labs     05/12/21  1630 05/13/21  0740    138   K 3.9 4.0    104   CO2 28 28   BUN 11 12   CREATININE 0.8* 0.8*   CALCIUM 9.1 9.0     Recent Labs     05/12/21  1630   AST 24   ALT 25   BILITOT 0.5   ALKPHOS 66     No results for input(s): INR in the last 72 hours. Recent Labs     05/12/21  1630 05/12/21  2145 05/13/21  0042   TROPONINI <0.01 <0.01 <0.01       Assessment/Plan:    Chest pain rule out ACS. ... EKG and 3 sets of troponins negative. Linda Mekes Awaiting stress test which may not be done until tomorrow    Essential hypertension. . Stable-continue lisinopril    Hyperlipidemia-continue statin        DVT Prophylaxis: Lovenox  Diet: Diet NPO, After Midnight Exceptions are: Sips of Water with Meds  Code Status: Full Code        Caryle Bos, MD

## 2021-05-13 NOTE — PROGRESS NOTES
05/13/21 0152   NIV Type   $NIV $Daily Charge   Skin Assessment Clean, dry, & intact   Skin Protection for O2 Device No  (per pt. )   NIV Started/Stopped On   Equipment Type V60   Mode CPAP   Mask Type Full face mask   Mask Size Medium   Settings/Measurements   CPAP/EPAP 8 cmH2O   Resp (!) 2   FiO2  21 %   Vt Exhaled 506 mL   Minute Volume 11.6 Liters   Mask Leak (lpm) 0 lpm   Comfort Level Good   Using Accessory Muscles No   Alarm Settings   Alarms On Y   Press Low Alarm 6 cmH2O   High Pressure Alarm 30 cmH2O   Delay Alarm 20 sec(s)   Resp Rate Low Alarm 6   High Respiratory Rate 40 br/min

## 2021-05-13 NOTE — PROGRESS NOTES
A GXT Myoview stress test was completed on this patient as ordered. The patient tolerated the procedure well. Awaiting stress imaging at this time.

## 2021-05-13 NOTE — DISCHARGE SUMMARY
Hospital Discharge Summary    Patient's PCP: Carmen Vaughan MD  Admit Date: 5/12/2021   Discharge Date: 5/13/2021    Admitting Physician: Dr. Aly Pittman MD  Discharge Physician: Dr. Celine Yee   Consults: none    Brief HPI/hospital course:     68-year-old male with a history of hypertension, cardiomyopathy was admitted with chest pain. .. He states that about 11 years ago he was evaluated for severe chest pain in [place], [place]. He underwent LHC. His coronaries were normal but he was told his heart was enlarged  On presentation to the ED, BP was 136/86, heart rate 81, respirations 14. . Chest x-ray was clear EKG and 3 sets of troponins negative for ischemia. He had no further episodes of chest pain admission. The patient subsequently had a nuclear med stress test that showed normal LV size, EF 63%, old scar in the inferior lateral, apical lateral anterolateral wall without acute ischemia  Patient was advised to continue with statin, aspirin and follow-up with PCP      Invasive procedures:  None    Discharge Diagnoses: Active Problems:    Hypertension    Chest pain in adult    Obesity (BMI 30-39. 9)  Resolved Problems:    * No resolved hospital problems. *      Physical Exam: BP (!) 144/91   Pulse 69   Temp 97.6 °F (36.4 °C) (Oral)   Resp 17   Ht 5' 10\" (1.778 m)   Wt 269 lb (122 kg)   SpO2 96%   BMI 38.60 kg/m²   Gen/overall appearance: Not in acute distress. Alert. Head: Normocephalic, atraumatic  Eyes: EOMI, good acuity  ENT:- Oral mucosa moist  Neck: No JVD, thyromegaly  CVS: Nml S1S2, no MRG, RRR  Pulm: Clear bilaterally. No crackles/wheezes  Gastrointestinal: Soft, NT/ND, +BS  Musculoskeletal: No edema. Warm  Neuro: No focal deficit. Moves extremity spontaneously. Psychiatry: Appropriate affect. Not agitated. Skin: Warm, dry with normal turgor. No rash        Significant Diagnostic Studies:    See above      Treatments: As above.       Discharge Medications:     Medication List

## 2021-05-13 NOTE — PROGRESS NOTES
05/13/21 0420   NIV Type   Skin Assessment Clean, dry, & intact   NIV Started/Stopped On   Equipment Type V60   Mode CPAP   Mask Type Full face mask   Mask Size Medium   Settings/Measurements   CPAP/EPAP 8 cmH2O   Resp 19   FiO2  21 %   Vt Exhaled 492 mL   Minute Volume 9.5 Liters   Mask Leak (lpm) 0 lpm   Comfort Level Good   Using Accessory Muscles No   Alarm Settings   Alarms On Y

## 2021-05-19 NOTE — PROGRESS NOTES
Home Medications:  Reviewed and are listed in nursing record. and/or listed below  Current Outpatient Medications   Medication Sig Dispense Refill    lisinopril (PRINIVIL;ZESTRIL) 20 MG tablet Take 1 tablet by mouth daily 30 tablet 3    pravastatin (PRAVACHOL) 20 MG tablet Take 20 mg by mouth daily Pt is unsure of the Mg      nitroGLYCERIN (NITROSTAT) 0.4 MG SL tablet Place 1 tablet under the tongue every 5 minutes as needed for Chest pain (may repeat 2 times only). 25 tablet 3    aspirin EC 81 MG EC tablet Take 1 tablet by mouth daily. 30 tablet 3     No current facility-administered medications for this visit. Allergies:  Atenolol and Hctz [hydrochlorothiazide]     Review of Systems:   A 14 point review of symptoms completed. Pertinent positives identified in the HPI, all other review of symptoms negative as below.     Objective:   PHYSICAL EXAM:    Vitals:    05/20/21 0857   BP: 131/81   Pulse: 68   SpO2: 97%    Weight: 276 lb (125.2 kg)     Wt Readings from Last 3 Encounters:   05/20/21 276 lb (125.2 kg)   05/12/21 269 lb (122 kg)   07/01/15 261 lb (118.4 kg)         General Appearance:  Alert, cooperative, no distress, appears stated age   Head:  Normocephalic, atraumatic   Eyes:  PERRL, conjunctiva/corneas clear   Nose: Nares normal, no drainage or sinus tenderness   Throat: Lips, mucosa, and tongue normal   Neck: Supple, symmetrical, trachea midline, NL thyroid no carotid bruit or JVD   Lungs:   CTAB, respirations unlabored   Chest Wall:  No tenderness or deformity   Heart:  Regular rhythm and normal rate; S1, S2 are normal;   no murmur noted; no rub or gallop   Abdomen:   Soft, non-tender, +BS x 4, no masses, no organomegaly   Extremities: Extremities normal, atraumatic, no cyanosis or edema   Pulses: 2+ and symmetric   Skin: Skin color, texture, turgor normal, no rashes or lesions   Pysch: Normal mood and affect   Neurologic: Normal gross motor and sensory exam.         LABS   CBC:      Lab Results   Component Value Date    WBC 9.7 2021    RBC 4.56 2021    HGB 13.2 2021    HCT 39.8 2021    MCV 87.3 2021    RDW 13.3 2021     2021     CMP:  Lab Results   Component Value Date     2021    K 4.0 2021     2021    CO2 28 2021    BUN 12 2021    CREATININE 0.8 2021    GFRAA >60 2021    GFRAA >60 2013    AGRATIO 1.1 2021    LABGLOM >60 2021    GLUCOSE 95 2021    PROT 7.4 2021    CALCIUM 9.0 2021    BILITOT 0.5 2021    ALKPHOS 66 2021    AST 24 2021    ALT 25 2021     PT/INR:   No results found for: PTINR  Liver:  No components found for: CHLPL  Lab Results   Component Value Date    ALT 25 2021    AST 24 2021    ALKPHOS 66 2021    BILITOT 0.5 2021     Lab Results   Component Value Date    LABA1C 5.4 2021     Lipids:         Lab Results   Component Value Date    TRIG 194 (H) 2021    TRIG 313 (H) 2014            Lab Results   Component Value Date    HDL 31 (L) 2021    HDL 26 (L) 2014            Lab Results   Component Value Date    LDLCALC 97 2021    LDLCALC see below 2014            Lab Results   Component Value Date    LABVLDL 39 2021    LABVLDL see below 2014         CARDIAC DATA   EK2021. Sinus bradycardia 59 bpm.  Otherwise normal    ECHO/MUGA: 13  Summary    Normal left ventricular systolic function, with visually estimated ejection    fraction of 55%. No regional wall motion abnormalities noted. Normal left ventricular filling pressure. There is mild concentric left ventricular hypertrophy. Systolic pulmonary artery pressure (SPAP) is normal and estimated at 23 mmHg    (RA pressure 3 mmHg). STRESS TEST 21  Summary Normal LV size and systolic function. Left ventricular ejection fraction of  63%. Normal wall motion.  There is a defect in the inferolateral, apical  lateral and anterolateral wall at stress that does not reverse at rest  concerning with scar. No gross ischemia       STRESS TEST: 7/2/14  Impression: 1. No evidence of stress induced myocardial ischemia. 2. LVEF 65%     CARDIAC CATH:    VASCULAR/OTHER IMAGING:      Assessment and Plan   Marcelle Scott is a 54 y.o. male who presents today for the following problems:      1. Chest pain: New. Somewhat atypical  2. Hypertension: Controlled today but states it has been spiking at home for unknown reason  3. Abnormal stress test    MD Plan:  1. Long discussion with patient and wife about options including cardiac CTA versus left heart cath. [place] decision to move ahead with left heart cath, risk, benefits, adverse events. - concerning for unstable angina/obstructive CAD. Needs acute evalulation  2. Continue aspirin, pravastatin, hold beta-blocker secondary to lowish heart rate. 3.  Given unusual features will start some work-up for blood pressure   -We will check renal artery Doppler for JOHN. - Check TSH via PCP. Also send off sed rate, CRP, plasma metanephrine   - He states he does has RAO and is using CPAP  4.  Echocardiogram  5. Difficult at this time to evaluate whether patient's chest pain is due to hypertensive episodes or obstructive CAD. Will follow after above tests      Patient Active Problem List   Diagnosis    Hypertension    Enlarged heart    Chest pain    Chest pain in adult    Obesity (BMI 30-39. 9)    SOB (shortness of breath)       Patient Plan:  1. Echocardiogram to view size and strength of the heart   2. Labs - TSH, sed rate, CRP  3. Renal artery doppler  4. Left Heart Cath (Angiogram)    ~No meds need held the morning of the procedure   ~You will need a    ~If intervention is needed you will stay overnight. Otherwise you will go home same day. It is a pleasure to assist in the care of Marcelle Scott. Please call with any questions. This note was scribed in the presence of Kenia Simmons MD by Jolanta Rojas RN. Peewee Oconnell MD, personally performed the services described in this documentation as scribed by the above signed scribe in my presence, and it is both accurate and complete to the best of our ability and knowledge. I agree with the details independently gathered by my clinical support staff, while the remaining scribed note accurately describes my personal service to the patient. The above RN is working as a scribe for and in the presence of myself . Working as a scribe, the RN may have prepopulated components of this note with my historical intellectual property under my direct supervision. Any additions to this intellectual property were performed at my direction. Furthermore, the content and accuracy of this note have been reviewed by me to the best of my ability.    *      Gautam Oropeza MD, [facility] Cardiologist  [address]  [phone] [facility]  [phone] [address] [place] [address]

## 2021-05-20 ENCOUNTER — TELEPHONE (OUTPATIENT)
Dept: CARDIOLOGY CLINIC | Age: 51
End: 2021-05-20

## 2021-05-20 ENCOUNTER — OFFICE VISIT (OUTPATIENT)
Dept: CARDIOLOGY CLINIC | Age: 51
End: 2021-05-20
Payer: COMMERCIAL

## 2021-05-20 VITALS
HEART RATE: 68 BPM | DIASTOLIC BLOOD PRESSURE: 81 MMHG | OXYGEN SATURATION: 97 % | HEIGHT: 70 IN | WEIGHT: 276 LBS | BODY MASS INDEX: 39.51 KG/M2 | SYSTOLIC BLOOD PRESSURE: 131 MMHG

## 2021-05-20 DIAGNOSIS — R06.02 SOB (SHORTNESS OF BREATH): ICD-10-CM

## 2021-05-20 DIAGNOSIS — R94.39 ABNORMAL STRESS TEST: ICD-10-CM

## 2021-05-20 DIAGNOSIS — R07.9 CHEST PAIN, UNSPECIFIED TYPE: Primary | ICD-10-CM

## 2021-05-20 DIAGNOSIS — I10 ESSENTIAL HYPERTENSION: ICD-10-CM

## 2021-05-20 PROCEDURE — 99215 OFFICE O/P EST HI 40 MIN: CPT | Performed by: INTERNAL MEDICINE

## 2021-05-20 RX ORDER — LISINOPRIL 20 MG/1
20 TABLET ORAL DAILY
Qty: 30 TABLET | Refills: 3 | Status: ON HOLD
Start: 2021-05-20 | End: 2021-06-21 | Stop reason: HOSPADM

## 2021-05-20 NOTE — PATIENT INSTRUCTIONS
Patient Plan:  1. Echocardiogram to view size and strength of the heart   2. Labs - TSH, sed rate, CRP  3. Renal artery doppler  4. Left Heart Cath (Angiogram)    ~No meds need held the morning of the procedure   ~You will need a    ~If intervention is needed you will stay overnight. Otherwise you will go home same day. Your provider has ordered testing for further evaluation. An order/prescription has been included in your paper work.  To schedule outpatient testing, contact Central Scheduling by calling [address] [place] [address] ([phone]).

## 2021-05-20 NOTE — TELEPHONE ENCOUNTER
Patient Plan:  1. Echocardiogram to view size and strength of the heart   2. Labs - TSH, sed rate, CRP  3. Renal artery doppler  4. Left Heart Cath (Angiogram)               ~No meds need held the morning of the procedure              ~You will need a               ~If intervention is needed you will stay overnight. Otherwise you will go home same day. Dr. Shabbir Gray is wanting the Echo and Renal done prior to the [facility]. We put the orders in STAT, so hopefully will be done soon.

## 2021-06-03 ENCOUNTER — HOSPITAL ENCOUNTER (OUTPATIENT)
Dept: NON INVASIVE DIAGNOSTICS | Age: 51
Discharge: HOME | End: 2021-06-03
Payer: COMMERCIAL

## 2021-06-03 ENCOUNTER — HOSPITAL ENCOUNTER (OUTPATIENT)
Dept: VASCULAR LAB | Age: 51
Discharge: HOME | End: 2021-06-03
Payer: COMMERCIAL

## 2021-06-03 ENCOUNTER — HOSPITAL ENCOUNTER (OUTPATIENT)
Age: 51
Discharge: HOME | End: 2021-06-03
Payer: COMMERCIAL

## 2021-06-03 DIAGNOSIS — R07.9 CHEST PAIN, UNSPECIFIED TYPE: ICD-10-CM

## 2021-06-03 DIAGNOSIS — I10 ESSENTIAL HYPERTENSION: ICD-10-CM

## 2021-06-03 DIAGNOSIS — R06.02 SOB (SHORTNESS OF BREATH): ICD-10-CM

## 2021-06-03 LAB
LV EF: 55 %
LVEF MODALITY: NORMAL
SEDIMENTATION RATE, ERYTHROCYTE: 14 MM/HR (ref 0–15)
TSH REFLEX FT4: 2.27 UIU/ML (ref 0.27–4.2)

## 2021-06-03 PROCEDURE — 84443 ASSAY THYROID STIM HORMONE: CPT

## 2021-06-03 PROCEDURE — 86140 C-REACTIVE PROTEIN: CPT

## 2021-06-03 PROCEDURE — 85652 RBC SED RATE AUTOMATED: CPT

## 2021-06-03 PROCEDURE — 93306 TTE W/DOPPLER COMPLETE: CPT

## 2021-06-03 PROCEDURE — 36415 COLL VENOUS BLD VENIPUNCTURE: CPT

## 2021-06-03 PROCEDURE — 93975 VASCULAR STUDY: CPT

## 2021-06-03 PROCEDURE — 83835 ASSAY OF METANEPHRINES: CPT

## 2021-06-04 ENCOUNTER — TELEPHONE (OUTPATIENT)
Dept: CARDIOLOGY CLINIC | Age: 51
End: 2021-06-04

## 2021-06-04 LAB — C-REACTIVE PROTEIN: <3 MG/L (ref 0–5.1)

## 2021-06-04 NOTE — TELEPHONE ENCOUNTER
Rhiannon..this was the patient that had testing ordered prior to his LHC being scheduled. He has completed the testing and now his LHC can be scheduled. No meds need held.

## 2021-06-04 NOTE — TELEPHONE ENCOUNTER
----- Message from Fabrizio Ceron MD sent at 6/4/2021 11:59 AM EDT -----  Let patient know their crp and tsh test is normal, continue current meds, no new orders or changes at this time. Thanks.

## 2021-06-07 LAB
METANEPH/PLASMA INTERP: NORMAL
METANEPHRINE FREE PLASMA: 0.13 NMOL/L (ref 0–0.49)
NORMETANEPHRINE FREE PLASMA: 0.33 NMOL/L (ref 0–0.89)

## 2021-06-10 ENCOUNTER — TELEPHONE (OUTPATIENT)
Dept: CARDIOLOGY CLINIC | Age: 51
End: 2021-06-10

## 2021-06-10 NOTE — TELEPHONE ENCOUNTER
----- Message from Edward Garnett MD sent at 6/9/2021  7:17 PM EDT -----  Inform patient last blood test finally came back shows no abnormalities.   We will continue to follow him for his blood pressure but does not appear that his labs are the cause

## 2021-06-21 ENCOUNTER — HOSPITAL ENCOUNTER (OUTPATIENT)
Dept: CARDIAC CATH/INVASIVE PROCEDURES | Age: 51
Discharge: HOME | End: 2021-06-21
Attending: INTERNAL MEDICINE | Admitting: INTERNAL MEDICINE
Payer: COMMERCIAL

## 2021-06-21 VITALS — WEIGHT: 270.2 LBS | BODY MASS INDEX: 38.68 KG/M2 | HEIGHT: 70 IN

## 2021-06-21 LAB
ANION GAP SERPL CALCULATED.3IONS-SCNC: 8 MMOL/L (ref 3–16)
BUN BLDV-MCNC: 16 MG/DL (ref 7–20)
CALCIUM SERPL-MCNC: 9.2 MG/DL (ref 8.3–10.6)
CHLORIDE BLD-SCNC: 103 MMOL/L (ref 99–110)
CHOLESTEROL, TOTAL: 176 MG/DL (ref 0–199)
CO2: 26 MMOL/L (ref 21–32)
CREAT SERPL-MCNC: 0.9 MG/DL (ref 0.9–1.3)
EKG ATRIAL RATE: 60 BPM
EKG DIAGNOSIS: NORMAL
EKG P AXIS: 57 DEGREES
EKG P-R INTERVAL: 160 MS
EKG Q-T INTERVAL: 404 MS
EKG QRS DURATION: 100 MS
EKG QTC CALCULATION (BAZETT): 404 MS
EKG R AXIS: -19 DEGREES
EKG T AXIS: 11 DEGREES
EKG VENTRICULAR RATE: 60 BPM
GFR AFRICAN AMERICAN: >60
GFR NON-AFRICAN AMERICAN: >60
GLUCOSE BLD-MCNC: 100 MG/DL (ref 70–99)
HCT VFR BLD CALC: 44.1 % (ref 40.5–52.5)
HDLC SERPL-MCNC: 37 MG/DL (ref 40–60)
HEMOGLOBIN: 14.8 G/DL (ref 13.5–17.5)
INR BLD: 1.08 (ref 0.86–1.14)
LDL CHOLESTEROL CALCULATED: 104 MG/DL
MCH RBC QN AUTO: 29.2 PG (ref 26–34)
MCHC RBC AUTO-ENTMCNC: 33.6 G/DL (ref 31–36)
MCV RBC AUTO: 86.8 FL (ref 80–100)
PDW BLD-RTO: 13.4 % (ref 12.4–15.4)
PLATELET # BLD: 181 K/UL (ref 135–450)
PMV BLD AUTO: 10.4 FL (ref 5–10.5)
POTASSIUM REFLEX MAGNESIUM: 4.3 MMOL/L (ref 3.5–5.1)
PROTHROMBIN TIME: 12.5 SEC (ref 10–13.2)
RBC # BLD: 5.08 M/UL (ref 4.2–5.9)
SODIUM BLD-SCNC: 137 MMOL/L (ref 136–145)
TRIGL SERPL-MCNC: 174 MG/DL (ref 0–150)
VLDLC SERPL CALC-MCNC: 35 MG/DL
WBC # BLD: 7.1 K/UL (ref 4–11)

## 2021-06-21 PROCEDURE — C1894 INTRO/SHEATH, NON-LASER: HCPCS

## 2021-06-21 PROCEDURE — C1769 GUIDE WIRE: HCPCS

## 2021-06-21 PROCEDURE — 93010 ELECTROCARDIOGRAM REPORT: CPT | Performed by: INTERNAL MEDICINE

## 2021-06-21 PROCEDURE — 85610 PROTHROMBIN TIME: CPT

## 2021-06-21 PROCEDURE — 80048 BASIC METABOLIC PNL TOTAL CA: CPT

## 2021-06-21 PROCEDURE — 80061 LIPID PANEL: CPT

## 2021-06-21 PROCEDURE — 6370000000 HC RX 637 (ALT 250 FOR IP)

## 2021-06-21 PROCEDURE — 93458 L HRT ARTERY/VENTRICLE ANGIO: CPT | Performed by: INTERNAL MEDICINE

## 2021-06-21 PROCEDURE — 6360000004 HC RX CONTRAST MEDICATION

## 2021-06-21 PROCEDURE — 93005 ELECTROCARDIOGRAM TRACING: CPT | Performed by: INTERNAL MEDICINE

## 2021-06-21 PROCEDURE — 99152 MOD SED SAME PHYS/QHP 5/>YRS: CPT | Performed by: INTERNAL MEDICINE

## 2021-06-21 PROCEDURE — 2500000003 HC RX 250 WO HCPCS

## 2021-06-21 PROCEDURE — 93458 L HRT ARTERY/VENTRICLE ANGIO: CPT

## 2021-06-21 PROCEDURE — 6360000002 HC RX W HCPCS

## 2021-06-21 PROCEDURE — 2709999900 HC NON-CHARGEABLE SUPPLY

## 2021-06-21 PROCEDURE — 85027 COMPLETE CBC AUTOMATED: CPT

## 2021-06-21 RX ORDER — HEPARIN SODIUM 1000 [USP'U]/ML
INJECTION, SOLUTION INTRAVENOUS; SUBCUTANEOUS
Status: COMPLETED | OUTPATIENT
Start: 2021-06-21 | End: 2021-06-21

## 2021-06-21 RX ORDER — MIDAZOLAM HYDROCHLORIDE 1 MG/ML
INJECTION INTRAMUSCULAR; INTRAVENOUS
Status: COMPLETED | OUTPATIENT
Start: 2021-06-21 | End: 2021-06-21

## 2021-06-21 RX ORDER — SODIUM CHLORIDE 9 MG/ML
25 INJECTION, SOLUTION INTRAVENOUS PRN
Status: DISCONTINUED | OUTPATIENT
Start: 2021-06-21 | End: 2021-06-21 | Stop reason: HOSPADM

## 2021-06-21 RX ORDER — ALPRAZOLAM 0.25 MG/1
0.5 TABLET ORAL EVERY 6 HOURS PRN
Status: DISCONTINUED | OUTPATIENT
Start: 2021-06-21 | End: 2021-06-21 | Stop reason: HOSPADM

## 2021-06-21 RX ORDER — LISINOPRIL AND HYDROCHLOROTHIAZIDE 20; 12.5 MG/1; MG/1
1 TABLET ORAL DAILY
Qty: 30 TABLET | Refills: 0 | Status: SHIPPED | OUTPATIENT
Start: 2021-06-21 | End: 2021-07-26 | Stop reason: SDUPTHER

## 2021-06-21 RX ORDER — SODIUM CHLORIDE 0.9 % (FLUSH) 0.9 %
5-40 SYRINGE (ML) INJECTION PRN
Status: DISCONTINUED | OUTPATIENT
Start: 2021-06-21 | End: 2021-06-21 | Stop reason: HOSPADM

## 2021-06-21 RX ORDER — FENTANYL CITRATE 50 UG/ML
INJECTION, SOLUTION INTRAMUSCULAR; INTRAVENOUS
Status: COMPLETED | OUTPATIENT
Start: 2021-06-21 | End: 2021-06-21

## 2021-06-21 RX ORDER — SODIUM CHLORIDE 0.9 % (FLUSH) 0.9 %
5-40 SYRINGE (ML) INJECTION EVERY 12 HOURS SCHEDULED
Status: DISCONTINUED | OUTPATIENT
Start: 2021-06-21 | End: 2021-06-21 | Stop reason: HOSPADM

## 2021-06-21 RX ADMIN — HEPARIN SODIUM 6200 UNITS: 1000 INJECTION, SOLUTION INTRAVENOUS; SUBCUTANEOUS at 12:29

## 2021-06-21 RX ADMIN — FENTANYL CITRATE 25 MCG: 50 INJECTION, SOLUTION INTRAMUSCULAR; INTRAVENOUS at 12:20

## 2021-06-21 RX ADMIN — FENTANYL CITRATE 25 MCG: 50 INJECTION, SOLUTION INTRAMUSCULAR; INTRAVENOUS at 12:29

## 2021-06-21 RX ADMIN — ALPRAZOLAM 0.5 MG: 0.25 TABLET ORAL at 09:19

## 2021-06-21 RX ADMIN — MIDAZOLAM HYDROCHLORIDE 1 MG: 1 INJECTION INTRAMUSCULAR; INTRAVENOUS at 12:29

## 2021-06-21 RX ADMIN — MIDAZOLAM HYDROCHLORIDE 2 MG: 1 INJECTION INTRAMUSCULAR; INTRAVENOUS at 12:21

## 2021-06-21 NOTE — LETTER
[address] [address]  Phone: [phone]  Fax: [phone]    Fátima Strickland [facility] CATH LAB ROOM 1        June 21, 2021     Patient: Amrik Pichardo   YOB: 1970   Date of Visit: 6/21/2021       To Whom It May Concern: It is my medical opinion that Harriet Nageotte may return to work without restrictions on Monday June 28, 2021. If you have any questions or concerns, please don't hesitate to call.     Sincerely,      Dr Kyra ramirez/dsc    SCHEDULE, [facility] CATH LAB ROOM 1

## 2021-06-21 NOTE — H&P
HERNIA REPAIR      SHOULDER ARTHROSCOPY         Medications:    No current facility-administered medications for this encounter. Pre-Sedation:  Pre-Sedation Documentation and Exam:  I have assessed the patient and agree with the H&P present on the chart. Prior History of Anesthesia Complications:   none    Modified Mallampati:  III (soft palate, base of uvula visible)    ASA Classification:  Class 3 - A patient with severe systemic disease that limits activity but is not incapacitating    Jake Scale: Activity:  2 - Able to move 4 extremities voluntarily on command  Respiration:  2 - Able to breathe deeply and cough freely  Circulation:  2 - BP+/- 20mmHg of normal  Consciousness:  2 - Fully awake  Oxygen Saturation (color):  2 - Able to maintain oxygen saturation >92% on room air    Sedation/Anesthesia Plan:  Guard the patient's safety and welfare. Minimize physical discomfort and pain. Minimize negative psychological responses to treatment by providing sedation and analgesia and maximize the potential amnesia. Patient to meet pre-procedure discharge plan.     Medication Planned:  midazolam intravenously and fentanyl intravenously    Patient is an appropriate candidate for plan of sedation: yes      Electronically signed by Fernandez Mcneal MD on 6/21/2021 at 9:09 AM

## 2021-06-21 NOTE — OP NOTE
Operative Note      Patient: Vazquez Benavides  YOB: 1970  MRN: [id number]      Cardiac Cath  Postoperative Note      1. Pre-operative Diagnosis: abnormal stress test        Post-operative Diagnosis: Same  2. Surgeons/Assistants: Sasmon Kirk MD, [facility]  3. Complications: None  4. Estimated Blood Loss: less than 50   5. Specimens: Were Not Obtained  6. Anesthesia: Local and Moderate Sedation  For sedation: Moderated sedation was achieved with Versed (3mg) and Fentanyl (50mcg). Monitoring of the patient's vital signs and respiratory status was provided by a trained independent observer nurse during the entire course of the procedure(s) and under my direct supervision and recorded in patient's medical record. The duration of sedation was 1219 to 1235.    7. Procedure(s) performed: Please see Xims chart for more detailed information on any catheter or equipment use. Further details on the procedure, sedation and proceedings of case  Moderate sedation  US access  [facility]  LVG  [place]        Procedure Details:  Consent Access  site Bleed Risk Sedat US   Used*? Contrast Flouro TIme Fluoro  mGy   Yes Rt radial Low MCSFC Yes 75mL 1.2 671   *CPT 41437: If stated \"yes\", Ultrasound guidance was used to access Rt radiala rtery using Seldinger technique after local infiltration of 1% lidocaine. Ultrasound(US) documented/visualized size, patency, pulsatility and exact location for puncture and determined ok to proceed. Real-time imaging used for needle entry into the vessel(s). Image was printed off [employer] and sent to medical records on a progress note.    *Sedation Note: MCSFC = minimal conscious sedation for comfort      Left Heart Cath:   Findings   LVEDP 17   LVEF 55%   LV wall motion normal   Gradient across AV No significant   Mitral regurg No significant     Coronary Angiogram:  Artery Findings/Result   LM luminals   LAD Luminals,  Trace prox calcicifation   LCx luminals   RI luminals   RCA Dominant, luminals,. Large RVM branch       Assessment/Plan  1. No significant CAD  2.  BP control  3. eval for noncardiac CP        Electronically signed by Beth Ramirez MD on 6/21/2021 at 12:42 PM

## 2021-06-22 ENCOUNTER — TELEPHONE (OUTPATIENT)
Dept: CARDIOLOGY CLINIC | Age: 51
End: 2021-06-22

## 2021-07-09 ENCOUNTER — OFFICE VISIT (OUTPATIENT)
Dept: CARDIOLOGY CLINIC | Age: 51
End: 2021-07-09
Payer: COMMERCIAL

## 2021-07-09 VITALS
OXYGEN SATURATION: 94 % | HEIGHT: 70 IN | DIASTOLIC BLOOD PRESSURE: 68 MMHG | HEART RATE: 82 BPM | BODY MASS INDEX: 38.01 KG/M2 | SYSTOLIC BLOOD PRESSURE: 126 MMHG | WEIGHT: 265.5 LBS

## 2021-07-09 DIAGNOSIS — I10 ESSENTIAL HYPERTENSION: ICD-10-CM

## 2021-07-09 DIAGNOSIS — E78.5 DYSLIPIDEMIA: ICD-10-CM

## 2021-07-09 DIAGNOSIS — I25.10 CORONARY ARTERY DISEASE INVOLVING NATIVE CORONARY ARTERY OF NATIVE HEART WITHOUT ANGINA PECTORIS: Primary | ICD-10-CM

## 2021-07-09 PROCEDURE — 99214 OFFICE O/P EST MOD 30 MIN: CPT | Performed by: NURSE PRACTITIONER

## 2021-07-09 ASSESSMENT — ENCOUNTER SYMPTOMS
GASTROINTESTINAL NEGATIVE: 1
RESPIRATORY NEGATIVE: 1

## 2021-07-09 NOTE — PROGRESS NOTES
chest pain. Gastrointestinal: Negative. Neurological: Positive for dizziness. OBJECTIVE:    Vital signs:    /68   Pulse 82   Ht 5' 10\" (1.778 m)   Wt 265 lb 8 oz (120.4 kg)   SpO2 94%   BMI 38.10 kg/m²      Physical Exam:  Constitutional:  Comfortable and alert, NAD, appears stated age  Eyes: PERRL, sclera nonicteric  Neck:  Supple, no masses, no thyroidmegaly, no JVD  Skin:  Warm and dry; no rash or lesions  Heart: Regular, normal apex, S1 and S2 normal, no M/G/R  Lungs:  Normal respiratory effort; clear; no wheezing/rhonchi/rales  Abdomen: soft, non tender, + bowel sounds  Extremities:  No edema or cyanosis; no clubbing  Neuro: alert and oriented, moves legs and arms equally, normal mood and affect  Right radial site soft, no hematoma, 2+ pulse    Data Reviewed:      Echo 6/2021:  Technically difficult examination. Left ventricular systolic function is normal with ejection fraction   estimated at 55%. There is mild concentric left ventricular hypertrophy. Normal left ventricular diastolic filling pressure. Systolic pulmonary artery pressure (SPAP) is normal estimated at 26 mmHg   (Right atrial pressure of 3 mmHg). Coronary angiogram 6/21/2021:    Findings   LVEDP 17   LVEF 55%   LV wall motion normal   Gradient across AV No significant   Mitral regurg No significant     Artery Findings/Result   LM luminals   LAD Luminals,  Trace prox calcicifation   LCx luminals   RI luminals   RCA Dominant, luminals,. Large RVM branch   Assessment/Plan  1. No significant CAD  2. BP control  3. eval for noncardiac CP    Cardiology Labs Reviewed:   CBC: No results for input(s): WBC, HGB, HCT, PLT in the last 72 hours. BMP:No results for input(s): NA, K, CO2, BUN, CREATININE, LABGLOM, GLUCOSE in the last 72 hours. PT/INR: No results for input(s): PROTIME, INR in the last 72 hours. APTT:No results for input(s): APTT in the last 72 hours.   FASTING LIPID PANEL:  Lab Results   Component Value Date HDL 37 06/21/2021    LDLDIRECT 133 07/02/2014    LDLCALC 104 06/21/2021    TRIG 174 06/21/2021     LIVER PROFILE:No results for input(s): AST, ALT, ALB in the last 72 hours. BNP: No results found for: PROBNP    Reviewed all labs and imaging today    Assessment:   CAD: stable, no angina; mild on angiogram 6/21/2021  HTN: improved  HLD: sub optimal, , continue statin and recheck  RAO: compliant with CPAP    Plan:   1. Continue aspirin, statin, lisinopril-HCTZ  2. Stay active along with a healthy diet  3. Check BP at home and call the office if consistently out of goal range  4.  Follow up in 1 year with Dr. Ant Jin, APRN-CNP  [facility]  [phone]

## 2021-07-09 NOTE — LETTER
[facility]   Cardiology Note              Date:  July 9, 2021  Patientname: Abigail Stephens  YOB: 1970    Primary Care physician: Keanu Galdamez    HISTORY OF PRESENT ILLNESS: Abigail Stephens is a 54 y.o. male with a history of CAD, HTN, HLD, RAO. Echo 6/2021 showed EF 55%. LHC 6/21/2021 showed mild CAD. Today he presents for hospital follow up for CAD s/p [facility]. He has random chest pressure mostly with sitting that lasts a few seconds, not associated with exertion. He has occasional dizziness upon standing. He has no shortness of breath or palpitations. Home BP controlled. He is active and walks 3-4 miles daily, tolerates well. Past Medical History:   has a past medical history of Enlarged heart, Hyperlipidemia, Hypertension, and Thyroid disease. Past Surgical History:   has a past surgical history that includes Shoulder arthroscopy and Hernia repair. Home Medications:    Prior to Admission medications    Medication Sig Start Date End Date Taking? Authorizing Provider   lisinopril-hydroCHLOROthiazide (PRINZIDE;ZESTORETIC) 20-12.5 MG per tablet Take 1 tablet by mouth daily 6/21/21   Beatriz Seals MD   pravastatin (PRAVACHOL) 20 MG tablet Take 20 mg by mouth daily Pt is unsure of the Mg    Historical Provider, MD   nitroGLYCERIN (NITROSTAT) 0.4 MG SL tablet Place 1 tablet under the tongue every 5 minutes as needed for Chest pain (may repeat 2 times only). 2/19/13   Abigail Bolaños MD   aspirin EC 81 MG EC tablet Take 1 tablet by mouth daily. 2/19/13   Abigail Bolaños MD     Allergies:  Atenolol and Hctz [hydrochlorothiazide]    Social History:   reports that he has never smoked. He has never used smokeless tobacco. He reports current alcohol use. He reports that he does not use drugs. Family History: family history is not on file. Review of Systems   Review of Systems   Constitutional: Negative. Respiratory: Negative.     Cardiovascular: Positive for chest pain. Gastrointestinal: Negative. Neurological: Positive for dizziness. OBJECTIVE:    Vital signs:    /68   Pulse 82   Ht 5' 10\" (1.778 m)   Wt 265 lb 8 oz (120.4 kg)   SpO2 94%   BMI 38.10 kg/m²      Physical Exam:  Constitutional:  Comfortable and alert, NAD, appears stated age  Eyes: PERRL, sclera nonicteric  Neck:  Supple, no masses, no thyroidmegaly, no JVD  Skin:  Warm and dry; no rash or lesions  Heart: Regular, normal apex, S1 and S2 normal, no M/G/R  Lungs:  Normal respiratory effort; clear; no wheezing/rhonchi/rales  Abdomen: soft, non tender, + bowel sounds  Extremities:  No edema or cyanosis; no clubbing  Neuro: alert and oriented, moves legs and arms equally, normal mood and affect  Right radial site soft, no hematoma, 2+ pulse    Data Reviewed:      Echo 6/2021:  Technically difficult examination. Left ventricular systolic function is normal with ejection fraction   estimated at 55%. There is mild concentric left ventricular hypertrophy. Normal left ventricular diastolic filling pressure. Systolic pulmonary artery pressure (SPAP) is normal estimated at 26 mmHg   (Right atrial pressure of 3 mmHg). Coronary angiogram 6/21/2021:    Findings   LVEDP 17   LVEF 55%   LV wall motion normal   Gradient across AV No significant   Mitral regurg No significant     Artery Findings/Result   LM luminals   LAD Luminals,  Trace prox calcicifation   LCx luminals   RI luminals   RCA Dominant, luminals,. Large RVM branch   Assessment/Plan  1. No significant CAD  2. BP control  3. eval for noncardiac CP    Cardiology Labs Reviewed:   CBC: No results for input(s): WBC, HGB, HCT, PLT in the last 72 hours. BMP:No results for input(s): NA, K, CO2, BUN, CREATININE, LABGLOM, GLUCOSE in the last 72 hours. PT/INR: No results for input(s): PROTIME, INR in the last 72 hours. APTT:No results for input(s): APTT in the last 72 hours.   FASTING LIPID PANEL:  Lab Results   Component Value Date HDL 37 06/21/2021    LDLDIRECT 133 07/02/2014    LDLCALC 104 06/21/2021    TRIG 174 06/21/2021     LIVER PROFILE:No results for input(s): AST, ALT, ALB in the last 72 hours. BNP: No results found for: PROBNP    Reviewed all labs and imaging today    Assessment:   CAD: stable, no angina; mild on angiogram 6/21/2021  HTN: improved  HLD: sub optimal, , continue statin and recheck  RAO: compliant with CPAP    Plan:   1. Continue aspirin, statin, lisinopril-HCTZ  2. Stay active along with a healthy diet  3. Check BP at home and call the office if consistently out of goal range  4.  Follow up in 1 year with Dr. Douglas Oswald, APRN-CNP  [address]  [phone]

## 2021-07-26 ENCOUNTER — TELEPHONE (OUTPATIENT)
Dept: CARDIOLOGY CLINIC | Age: 51
End: 2021-07-26

## 2021-07-26 RX ORDER — LISINOPRIL AND HYDROCHLOROTHIAZIDE 20; 12.5 MG/1; MG/1
1 TABLET ORAL DAILY
Qty: 30 TABLET | Refills: 9 | Status: SHIPPED | OUTPATIENT
Start: 2021-07-26

## 2021-07-26 NOTE — TELEPHONE ENCOUNTER
Refill  lisinopril-hydroCHLOROthiazide (PRINZIDE;ZESTORETIC) 20-12.5 MG per tablet     [facility] - [address] [place] [address] Drive - F [phone]

## 2021-08-13 ENCOUNTER — TELEPHONE (OUTPATIENT)
Dept: CARDIOLOGY CLINIC | Age: 51
End: 2021-08-13

## 2021-08-13 NOTE — TELEPHONE ENCOUNTER
Wife presented herself into the [address] [place] [address] office w/ a Medical Clearance for for a job that pt is applying to . Pt see's JJP and form was faxed to [facility] @ [phone]. Please see attached form.

## 2021-10-20 ENCOUNTER — HOSPITAL ENCOUNTER (OUTPATIENT)
Dept: GENERAL RADIOLOGY | Age: 51
Discharge: HOME | End: 2021-10-20
Payer: COMMERCIAL

## 2021-10-20 ENCOUNTER — HOSPITAL ENCOUNTER (OUTPATIENT)
Age: 51
Discharge: HOME | End: 2021-10-20
Payer: COMMERCIAL

## 2021-10-20 DIAGNOSIS — R52 PAIN: ICD-10-CM

## 2021-10-20 PROCEDURE — 72100 X-RAY EXAM L-S SPINE 2/3 VWS: CPT

## 2022-08-08 NOTE — PROGRESS NOTES
PULMONARY, CRITICAL CARE AND SLEEP MEDICINE   CC: Snoring  Referring Provider: Patient is being seen at the request of Satinder Bravo CNP, for a consultation to evaluate for Obstructive Sleep Apnea. Former Dr. Steve Marquez pt. 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 Steve Marquez in 2015 who was diagnosed with severe RAO on HST with AHI of 71 and concern for cardiac dysrhythmia. Orders were placed for CPAP to [place] and pt was subsequently lost to follow up but he has been using CPAP therapy ever since. Now presents with a 1.5 year history of moderate daytime sleepiness, not improved with more sleep, and associated with frequently interrupted & restless sleep. States his smart watch tells him he only gets about 2-3 hours of deep sleep per night. Inititially CPAP made a \"night and day difference,\" but is now concerned that treatment needs to be re-checked. His treatment was never evaluated after set up. No significant weight change since starting CPAP. Does have difficulty breathing out against high pressures. [place] is 2. No car wrecks or near wrecks because of the sleepiness. No nodding off while driving. Reports no significant weight fluctuation in last 5 yrs. Was never diagnosed with abnormal rhythm & has been evaluated by cardiology with [address] [place] [address] 6/2021. + history of HTN. Never smoker. Has a [employer] that is 9years old. Does not have machine for download. Presenting HPI Dr. Steve Marquez 7/1/15: 6 mo h/o severe snoring a/w EDS & observed apneas. 15 lb weight gain as exacerbating factor. [place] 15. reports that he has never smoked.  He has never used smokeless tobacco.    Past Medical History:   Diagnosis Date    Enlarged heart     by cath 2011//dur to atenolol 25???//    Hyperlipidemia     Hypertension     Thyroid disease     pt states that his most rescent blood showed that he does not require medication     Past Surgical History:   Procedure Laterality Date HERNIA REPAIR      SHOULDER ARTHROSCOPY       Allergies   Allergen Reactions    Atenolol Other (See Comments)     Enlarged heart//????    Hctz [Hydrochlorothiazide] Other (See Comments)     Enlarged heart with bp med//2011??? Medication list was reviewed and updated as needed in Epic.    family history includes Cancer in his maternal aunt and maternal grandmother; Cancer (age of onset: 48) in his sister; Emphysema in his mother; Sleep Apnea in his sister. Review of Systems: Complete Review of system reviewed with patient and noted on attached review of system sheet. PHYSICAL EXAM:  Blood pressure 118/64, pulse 62, resp. rate 18, height 5' 10\" (1.778 m), weight 279 lb (126.6 kg), SpO2 96 %.'   261# Jul 2015; 279# Aug 2022;   Constitutional:  No acute distress. Pleasant. HENT:  Oropharynx is clear and moist. No thyromegaly. Mallampati class III airway with very hypertrophied bilateral soft tissue structures. Eyes:  Conjunctivae are normal. Pupils equal, round, and reactive to light. No scleral icterus. Neck:  No tracheal deviation present. No obvious thyroid mass. Circumference is 18 inches. CV:  Normal rate, regular rhythm, normal heart sounds. No lower extremity edema. Pulm/Chest:  Clear breath sounds. No wheezes, rales or rhonchi. No accessory muscle usage or stridor. Moves air well. Abdominal:  Soft. No distension or obvious hernia. No tenderness or guarding. Musculoskeletal:  No cyanosis. No clubbing. No obvious joint deformity. Skin:  Skin is warm and dry. No rash or nodules on the exposed extremities. Psychiatric:  Normal mood and affect. Behavior is normal.    Neurological:  Alert, awake and oriented. No obvious cranial nerve deficits. Speech fluent    DATA:  Review of PCP records    Echo 6/2021: EF 55%. Mild concentric LVH. Normal LV diastolic filling pressure. SPAP 26 mmHg. PSG 7/8/2015: AHI 71     Titration 8/3/2015: APAP 16-22.   SpO2 april of 55% with 57% of night spent <89%.  Frequent pulse rate variability with >101 episodes of >6 BPM variation per hour. Irregularity of the pulse rate signals possible cardiac dysrhythmia. ASSESSMENT:  Severe RAO, AHI 71 on HST  Excessive daytime sleepiness  Chronic fatigue  Frequent nocturnal awakenings x1.5 yrs  Mild CAD s/p [facility] 6/2021 f/b Dr. Rachel Ponce   Comorbid conditions: Obesity, HTN, HLD  Never smoker    PLAN:   APAP 16-22 cm H2O, pt to bring to DME or office for download  Sleep hygiene tips discussed and provided. Specifically cautioned: absolutely no driving motorized vehicles or operating heavy machinery while fatigued, drowsy or sleepy. Weight loss is also recommended as a long-term intervention. Pathophysiology and complications of untreated RAO were discussed with patient to include systemic hypertension, pulmonary hypertension, cardiovascular morbidities, car accidents and all cause mortality.     Recall website & phone number   CPAP titration then new ResMed machine (may be BiPAP)   31-90 day     CC: Lakeshia Bacon, MELO & Dr. Debi Carlson, CNP

## 2022-08-09 ENCOUNTER — OFFICE VISIT (OUTPATIENT)
Dept: PULMONOLOGY | Age: 52
End: 2022-08-09
Payer: COMMERCIAL

## 2022-08-09 VITALS
WEIGHT: 279 LBS | HEIGHT: 70 IN | SYSTOLIC BLOOD PRESSURE: 118 MMHG | HEART RATE: 62 BPM | RESPIRATION RATE: 18 BRPM | DIASTOLIC BLOOD PRESSURE: 64 MMHG | OXYGEN SATURATION: 96 % | BODY MASS INDEX: 39.94 KG/M2

## 2022-08-09 DIAGNOSIS — G47.00 FREQUENT NOCTURNAL AWAKENING: ICD-10-CM

## 2022-08-09 DIAGNOSIS — G47.19 EXCESSIVE DAYTIME SLEEPINESS: ICD-10-CM

## 2022-08-09 DIAGNOSIS — G47.33 OSA (OBSTRUCTIVE SLEEP APNEA): Primary | ICD-10-CM

## 2022-08-09 DIAGNOSIS — R53.82 CHRONIC FATIGUE: ICD-10-CM

## 2022-08-09 PROBLEM — E66.9 OBESITY (BMI 30-39.9): Chronic | Status: ACTIVE | Noted: 2021-05-12

## 2022-08-09 PROCEDURE — 99214 OFFICE O/P EST MOD 30 MIN: CPT

## 2022-08-09 RX ORDER — ERGOCALCIFEROL (VITAMIN D2) 1250 MCG
CAPSULE ORAL
COMMUNITY
Start: 2022-06-02

## 2022-08-09 ASSESSMENT — SLEEP AND FATIGUE QUESTIONNAIRES
HOW LIKELY ARE YOU TO NOD OFF OR FALL ASLEEP WHILE WATCHING TV: 1
HOW LIKELY ARE YOU TO NOD OFF OR FALL ASLEEP IN A CAR, WHILE STOPPED FOR A FEW MINUTES IN TRAFFIC: 0
NECK CIRCUMFERENCE (INCHES): 18
HOW LIKELY ARE YOU TO NOD OFF OR FALL ASLEEP WHILE SITTING AND READING: 0
HOW LIKELY ARE YOU TO NOD OFF OR FALL ASLEEP WHILE SITTING AND TALKING TO SOMEONE: 0
ESS TOTAL SCORE: 2
HOW LIKELY ARE YOU TO NOD OFF OR FALL ASLEEP WHEN YOU ARE A PASSENGER IN A CAR FOR AN HOUR WITHOUT A BREAK: 0
HOW LIKELY ARE YOU TO NOD OFF OR FALL ASLEEP WHILE SITTING QUIETLY AFTER LUNCH WITHOUT ALCOHOL: 1
HOW LIKELY ARE YOU TO NOD OFF OR FALL ASLEEP WHILE SITTING INACTIVE IN A PUBLIC PLACE: 0
HOW LIKELY ARE YOU TO NOD OFF OR FALL ASLEEP WHILE LYING DOWN TO REST IN THE AFTERNOON WHEN CIRCUMSTANCES PERMIT: 0

## 2022-08-09 NOTE — PATIENT INSTRUCTIONS
What's next:  First, you will schedule a CPAP titration with the sleep lab. (Call them if you don't hear from them.)  Work on sleep hygiene while awaiting your sleep study. We call you with the results of the sleep study and let you know our recommendations. We will ask you what medical supply company (\"DME\") we should send the CPAP prescription to. They will get you set up with your machine and equipment. You can ask them for a different mask if what you tried isn't comfortable. After getting set up with a new machine, you come back and see me within 31-90 days. At this appointment, insurance will need to see that you are using the device at least 70% of nights in a month for at least 4 hours each night. Great to meet you and take care [address]      Never drive a car or operate a motorized vehicle while drowsy or sleepy. Sleep Hygiene. .. Important practices for better sleep:    Avoid naps. This will ensure you are sleepy at bedtime. If you have to take a nap, sleep less than 1 hour, before 3 pm.  SCHEDULE: Have a fixed bedtime and awakening time. The human body thrives on routines. Only deviate from these set sleep times about 1-2 hours on the weekends (more than this will start altering your internal clock). You will feel better keeping a regular sleep cycle and giving your body a dependable pattern, even (especially) if you are retired or not working. Use light to train your biological clock: When you get up in the morning, exposure yourself to bright lights. When preparing for bed, dim all the lights and avoid exposure to screens. Go to bed only when sleepy; this reduces the time you are awake in bed (which can lead to frustration and negative thoughts about sleep). If you can't fall asleep within 15-30 minutes, get up and do something boring until you feel sleepy again. Sit quietly in the dark or read the warranty on your refrigerator.  Don't expose yourself to bright light whether or not this is a source of your problem, and make appropriate changes. The Sleep Center at [address] [place] [address], [address] [place] [address], [address] [place] [address]                      Phone: [phone] Fax: [phone]      Your appointment for a sleep study is scheduled on -- at 8:30pm. Please arrive at the [facility] at the time indicated. On Saturday and Sunday night a sleep tech will come down to let you in the building and escort you upstairs to the sleep center; please call from the parking lot if no one is at the door when you arrive. PLEASE DO NOT ARRIVE TO THE SLEEP CENTER ANY EARLIER THAN 8:30PM AS THERE IS NO STAFF ON DUTY AND THE DOORS WILL BE LOCKED    IMPORTANT: We ask that you please phone the [address] [place] [address] Patient Pre-Services ([phone]) at least 3-5 days prior to your sleep study to pre-register. Failing to pre-register may ultimately cause your insurance to not pay for this procedure. Please be aware that [address] [place] [address] is a non-smoking facility. There is no smoking allowed anywhere on [place] at any time. Each patient room is a private room with cable television, WiFi and a private bathroom with shower facilities. The test itself consists of electrodes and sensors attached to specific areas of your scalp, face, chest and legs. We will also monitor respiratory effort, nasal and oral airflow and oxygen levels. The test will not hurt; it is completely painless and not invasive in any way. Please bring with you:  Appropriate nightclothes (pajamas, sweats, etc.), slippers and robe  All medications you need during your stay, including breathing medications, nebulizers and metered dose inhalers, as well as a complete list of all medications you are currently taking.    Your own toiletries and hairdryer if you wish to shower before you leave  Current Photo I.D. and insurance card  We do not allow any pillows or bed linens from home due to health regulations  We recommend that you do not bring valuables with you to the Sleep Center as we cannot be responsible for any lost or damaged personal items. Please refrain from or reduce the use of caffeine and/or alcohol prior to your sleep study and avoid napping the day of your study as these will affect the accuracy of your test results. If you are ill the day of your test (cold, upper respiratory infection, flu, etc.) please call to reschedule your test as the test will not be accurate if you are ill. If you should need to cancel or reschedule your appointment, please call the Sleep Center at [phone] as soon as possible. Please also call the Sleep Center directly to let us know if you have any special needs, dietary needs or for any further questions. [place] [phone] OR  Subject Company.[employer]. [employer]/e/sleep/communications/src-update    CPAP Equipment Cleaning and Disinfecting Schedule  Equipment Cleaning Frequency Instructions  Disinfecting Frequency   Non-Disposable Filters  Weekly Mild soapy water, Rinse, Air Dry Not Required   Disposable Filters Change as needed  2-4 weeks Do Not Wash Not Required   Hose/tubing Daily Mild soapy water, Rinse, Air Dry Once a week   Mask / Nasal Pillows Daily Mild soapy water, Rinse, Air Dry Once a week   Headgear Weekly Hand wash, Mild soapy water, Rinse, Dry  Not Required   Humidifier Daily Empty water daily  Mild soapy water, Rinse well, Air Dry  Once a week   CPAP Unit As Needed Dust with damp cloth,  No detergents or sprays Not Required         Disinfect (per schedule) with 1 part white vinegar and 3 parts water- soak mask and water chamber for 30 minutes every 1-2 weeks, more often if sick. Allow water/vinegar mixture to run through tubing. Allow all equipment to air dry. Drying Hints:   Always hang tubing away from direct sunlight, as this will cause the tubing to become yellow, brittle and crack over a period of time.  DO NOT attach the wet tubing to your CPAP unit to blow-dry it. The moisture from the tubing can drain back into your machine. Moisture in your unit can cause sudden pressure increases or short circuits  DO's and DON'Ts:  - Don't use alcohol-based products to clean your mask, because it can cause the materials to become hard and brittle. - Don't put headgear in the washer or dryer  - Don't use any caustic or household cleaning solutions such as bleach on your CPAP   equipment.  - Do follow the recommended cleaning schedule. - Do change your disposable filter frequently. Adapted From: [employer]/cleaning. shtm. These are general suggestions for all models please follow specific s recommendations and specific instructions       [facility] 67  [phone]  [phone] 7300 [facility], [address] [place] [address]       (3801 [facility]) [phone] Jasiel Rabago, [address]   [facility]0 [facility] [phone] Jett Escobar [address] [phone] [address] [place] [address]. [facility]  **Near [address] [facility],[address] [phone]  [place] [address], [address]   [address] [facility] [phone] or  270 [facility] 64, [address] [facility]**   [facility] [address], Luige Roddy 10   [address] [place] [phone] opt4 opt2 [phone] Fax Order to them and they will forward to [facility] Surgical [address] [place] [address], 1501 [facility]   [facility] [phone] or  [phone] [address], [address]  **Near [employer] - Jorge Alberto Schein, not DME  No insurance, [employer]  ONLY [phone]  800-Cpap.com [phone]    CPAP. COM (online) [phone] Online   [employer] [phone] [address], [place], VreedCritical access hospitalaven 78   [facility]  Oxygen/PAP supplies only [phone] 3073 [facility], [address], 1415 [facility]  Oxygen/[phone] [facility] 43, 1000 [facility] [address] Erica Uriostegui  Oxygen/PAP/NIV [phone] Gisela   [address], Viv[address] 27   [facility]  Oxygen/PAP/NIV [phone] Chris  96., 254 [facility],2Nd Floor  Oxygen/PAP/ [address] Kelly [facility]   [address]  Oxygen/PAP/NIV [phone] [address] [facility] Route [facility], [address] [facility] [address]   [facility] Respiratory  ([address])  Oxygen/ [phone] [facility] 399 50  [address] 92  (Portable O2 Conctrator) [phone] 64088 [facility],1St Floor  [facility], [facility] 307   Leading Respiratory  [phone] 76 [address] [place], [address]  **Past [address]  ([place] [address])  Oxygen/PAP/NIV   [phone]   Marjorie Estrella, [address]  **[facility] 100 [facility]  Oxygen/[phone]  [place], [address]  Oxygen/[phone] [address] Tez Perkins 25 [address] [facility], [address]   [facility]  Oxygen/[phone] 309 [address] [facility] Vivian 27   [address]hell[address]  Oxygen/[phone] 1119 [facility], [address] [place] [address]  Oxygen/[phone] 5165 [address] Ro[address]  [facility]  Oxygen/PAP [phone] 4[address], [facility]  **West Side inside [address]  ([address])  Oxygen/PAP/NIV   [phone] Estrella, [address]  **[address] [place]   Calista's -Fontanez hien  Oxygen/ [address]  [phone] [facility]  Iza [facility]  **NE of [address]  [phone] [facility]. [address]   Patient Aids -- [address] hien   Oxygen/PAP/NIV [phone] 225 [facility], [address]   Patient Aids [facility]  Oxygen/[phone]  Option 4 [phone] 5940 [facility].  Lizzette Benavides, 100 [facility]   [facility] [phone] Maureen Hsieh [address] Marc Manning, 30 [facility]  Oxygen/PAP/NIV [address] [place] [address] [address]kw[address] 2011 [facility] [address] [place] [address]  Oxygen/[phone] 1325 [facility], [address]  Oxygen/PAP/NIV 47 [facility]  [phone] 7351 [facility]. [place], [address] [place] [address]   [facility]  Oxygen/PAP/NIV [address], 8105 [facility]   Sleep Mgmt.  Associates  (formerly RAO)  CPAP only [address], [address]  **[address] [facility] 60 [phone]    1-800 CPAP (store) 2301 [facility],[address]  [phone]    [address] [phone] 511  544,[address]     Marcie Felix [phone]                 ICP (ZOFE) [phone]             38[address] [facility] [place] [facility]         (no PAP equipment)  [phone]

## 2022-08-10 ENCOUNTER — TELEPHONE (OUTPATIENT)
Dept: PULMONOLOGY | Age: 52
End: 2022-08-10

## 2022-08-10 DIAGNOSIS — G47.33 OSA (OBSTRUCTIVE SLEEP APNEA): Primary | ICD-10-CM

## 2022-08-10 NOTE — TELEPHONE ENCOUNTER
CPAP titration then new ResMed machine (may be BiPAP)  31-90 day after receiving machine. Pt to bring machine in to office or DME for download. Watch for titration results then new order for resmed machine. Schedule 31-90 f/u after titration results are received. F/u to make sure pt gets CR download.

## 2022-08-19 NOTE — TELEPHONE ENCOUNTER
Spoke with pt and reminded him that we needed a compliance download. Pt stated he will bring to office Monday for a download.

## 2022-08-22 NOTE — TELEPHONE ENCOUNTER
Pt brought sd card in for download. Sd card was not [employer] card. Compliance report was not able to be downloaded via card.  Pt given [place] Cork card and stated he will come back Thursday or Friday 8/25 or 8/26

## 2022-09-16 NOTE — TELEPHONE ENCOUNTER
Please tell pt that based on what his machine is able to tell us, his usage and treatment both look great, I won't make any changes in the meantime while awaiting the titration unless he would like to try increasing the pressure slightly to see if this helps with daytime symptoms?    If he is willing to try pressure increase, then change from min EPAP of 16 to  APAP 17-20

## 2022-09-16 NOTE — TELEPHONE ENCOUNTER
Called pt and informed him of cinthya wang's response. Pt stated he would like to try the pressure increase. Pt to come in Wednesday to get pressure changed while RT is in office.

## 2022-09-17 SDOH — HEALTH STABILITY: PHYSICAL HEALTH: ON AVERAGE, HOW MANY MINUTES DO YOU ENGAGE IN EXERCISE AT THIS LEVEL?: 30 MIN

## 2022-09-17 SDOH — HEALTH STABILITY: PHYSICAL HEALTH: ON AVERAGE, HOW MANY DAYS PER WEEK DO YOU ENGAGE IN MODERATE TO STRENUOUS EXERCISE (LIKE A BRISK WALK)?: 3 DAYS

## 2022-09-20 ENCOUNTER — OFFICE VISIT (OUTPATIENT)
Dept: ORTHOPEDIC SURGERY | Age: 52
End: 2022-09-20
Payer: COMMERCIAL

## 2022-09-20 VITALS — HEIGHT: 71 IN | BODY MASS INDEX: 37.8 KG/M2 | WEIGHT: 270 LBS

## 2022-09-20 DIAGNOSIS — M54.40 ACUTE RIGHT-SIDED LOW BACK PAIN WITH SCIATICA, SCIATICA LATERALITY UNSPECIFIED: Primary | ICD-10-CM

## 2022-09-20 PROCEDURE — 99204 OFFICE O/P NEW MOD 45 MIN: CPT | Performed by: PHYSICIAN ASSISTANT

## 2022-09-20 RX ORDER — METHOCARBAMOL 500 MG/1
500 TABLET, FILM COATED ORAL
COMMUNITY
Start: 2022-08-31

## 2022-09-20 RX ORDER — GABAPENTIN 300 MG/1
300 CAPSULE ORAL 3 TIMES DAILY
Qty: 90 CAPSULE | Refills: 0 | Status: SHIPPED | OUTPATIENT
Start: 2022-09-20 | End: 2022-10-25 | Stop reason: SDUPTHER

## 2022-09-20 NOTE — PROGRESS NOTES
20 mg by mouth daily Pt is unsure of the Mg, Disp: , Rfl:     nitroGLYCERIN (NITROSTAT) 0.4 MG SL tablet, Place 1 tablet under the tongue every 5 minutes as needed for Chest pain (may repeat 2 times only). , Disp: 25 tablet, Rfl: 3    aspirin EC 81 MG EC tablet, Take 1 tablet by mouth daily. , Disp: 30 tablet, Rfl: 3    Allergies:  Atenolol and Hctz [hydrochlorothiazide]    Social History:    reports that he has never smoked. He has never used smokeless tobacco. He reports current alcohol use. He reports that he does not use drugs. Family History:   Family History   Problem Relation Age of Onset    Emphysema Mother     Sleep Apnea Sister     Cancer Sister 48        lymphoma    Cancer Maternal Aunt         lung    Cancer Maternal Grandmother         stomach cancer    Asthma Neg Hx     Diabetes Neg Hx     Heart Failure Neg Hx     Hypertension Neg Hx        REVIEW OF SYSTEMS: Full ROS noted & scanned   CONSTITUTIONAL: Denies unexplained weight loss, fevers, chills or fatigue  NEUROLOGICAL: Denies unsteady gait or progressive weakness  MUSCULOSKELETAL: Denies joint swelling or redness  PSYCHOLOGICAL: Denies anxiety, depression   SKIN: Denies skin changes, delayed healing, rash, itching   HEMATOLOGIC: Denies easy bleeding or bruising  ENDOCRINE: Denies excessive thirst, urination, heat/cold  RESPIRATORY: Denies current dyspnea, cough  GI: Denies nausea, vomiting, diarrhea   : Denies bowel or bladder issues      PHYSICAL EXAM:    Vitals: Height 5' 10.5\" (1.791 m), weight 270 lb (122.5 kg). GENERAL EXAM:  General Apparence: Patient is adequately groomed with no evidence of malnutrition. Orientation: The patient is oriented to time, place and person. Mood & Affect:The patient's mood and affect are appropriate. Vascular: Examination reveals no swelling tenderness in upper or lower extremities. Good capillary refill.   Lymphatic: The lymphatic examination bilaterally reveals all areas to be without enlargement or induration  Sensation: Sensation is intact without deficit  Coordination/Balance: Good coordination. LUMBAR/SACRAL EXAMINATION:  Inspection: Local inspection shows no step-off or bruising. Lumbar alignment is normal.  Sagittal and Coronal balance is neutral.      Palpation:   No evidence of tenderness at the midline. No tenderness bilaterally at the paraspinal or trochanters. There is no step-off or paraspinal spasm. Range of Motion: Lumbar flexion, extension and rotation are mildly limited due to pain. Strength:   Strength testing is 5/5 in all muscle groups tested. Special Tests:   Straight leg raise and crossed SLR negative. Leg length and pelvis level. Skin: There are no rashes, ulcerations or lesions. Reflexes: Reflexes are symmetrically 2+ at the patellar and ankle tendons. Clonus absent bilaterally at the feet. Gait & station: normal, patient ambulates without assistance    Additional Examinations:   RIGHT LOWER EXTREMITY: Inspection/examination of the right lower extremity does not show any tenderness, deformity or injury. Range of motion is unremarkable. There is no gross instability. There are no rashes, ulcerations or lesions. Strength and tone are normal.  LEFT LOWER EXTREMITY:  Inspection/examination of the left lower extremity does not show any tenderness, deformity or injury. Range of motion is unremarkable. There is no gross instability. There are no rashes, ulcerations or lesions. Strength and tone are normal.    Diagnostic Testing:    X-rays: 2 views of the lumbar spine that were obtained today in the office to include AP and lateral were independently reviewed with the patient which shows well-maintained lumbar lordosis with anterior spurring from L3-L5. There is disc space narrowing at L4-5 and L5-S1. Impression:   Lumbar spondylosis with radiculopathy  Lumbar DDD    1. Acute right-sided low back pain with sciatica, sciatica laterality unspecified        Plan:       We discussed the diagnosis and treatment options including observation, additional oral steroids, physical therapy, epidural injections and additional imaging. He wishes to proceed with lumbar MRI. I also sent a prescription for gabapentin 300 mg to be taken 1 p.o. 3 times daily to his pharmacy. Educational material was provided for dosage. Follow up -after MRI to review the results and to discuss treatment options. Old records were reviewed.     Hai Crow PA-C  Board certified by the [address] [facility]

## 2022-10-11 ENCOUNTER — HOSPITAL ENCOUNTER (OUTPATIENT)
Dept: MRI IMAGING | Age: 52
Discharge: HOME | End: 2022-10-11
Payer: COMMERCIAL

## 2022-10-11 ENCOUNTER — HOSPITAL ENCOUNTER (OUTPATIENT)
Dept: SLEEP CENTER | Age: 52
Discharge: HOME | End: 2022-10-11
Payer: COMMERCIAL

## 2022-10-11 DIAGNOSIS — R53.82 CHRONIC FATIGUE: ICD-10-CM

## 2022-10-11 DIAGNOSIS — G47.19 EXCESSIVE DAYTIME SLEEPINESS: ICD-10-CM

## 2022-10-11 DIAGNOSIS — M54.32 SCIATICA OF LEFT SIDE WITHOUT BACK PAIN: ICD-10-CM

## 2022-10-11 DIAGNOSIS — G47.33 OSA (OBSTRUCTIVE SLEEP APNEA): ICD-10-CM

## 2022-10-11 PROCEDURE — 72148 MRI LUMBAR SPINE W/O DYE: CPT

## 2022-10-11 PROCEDURE — 95811 POLYSOM 6/>YRS CPAP 4/> PARM: CPT

## 2022-10-12 ENCOUNTER — TELEPHONE (OUTPATIENT)
Dept: ORTHOPEDIC SURGERY | Age: 52
End: 2022-10-12

## 2022-10-12 PROBLEM — G47.19 EXCESSIVE DAYTIME SLEEPINESS: Status: ACTIVE | Noted: 2022-10-12

## 2022-10-12 PROBLEM — G47.33 OSA (OBSTRUCTIVE SLEEP APNEA): Status: ACTIVE | Noted: 2022-10-12

## 2022-10-12 PROBLEM — R53.82 CHRONIC FATIGUE: Status: ACTIVE | Noted: 2022-10-12

## 2022-10-12 PROCEDURE — 95811 POLYSOM 6/>YRS CPAP 4/> PARM: CPT | Performed by: INTERNAL MEDICINE

## 2022-10-12 NOTE — TELEPHONE ENCOUNTER
Left him a message I was returning his call, he was suppose to make a follow up appt with Evan Kolb for his MRI .

## 2022-10-12 NOTE — TELEPHONE ENCOUNTER
Appointment Request     Patient requesting earlier appointment: Yes  Appointment offered to patient: yes  Patient Contact Number: [phone]    [place] location. Nany Jewell

## 2022-10-17 ENCOUNTER — TELEPHONE (OUTPATIENT)
Dept: PULMONOLOGY | Age: 52
End: 2022-10-17

## 2022-10-17 DIAGNOSIS — G47.33 OSA (OBSTRUCTIVE SLEEP APNEA): Primary | ICD-10-CM

## 2022-10-18 ENCOUNTER — OFFICE VISIT (OUTPATIENT)
Dept: ORTHOPEDIC SURGERY | Age: 52
End: 2022-10-18
Payer: COMMERCIAL

## 2022-10-18 VITALS — BODY MASS INDEX: 38.65 KG/M2 | HEIGHT: 70 IN | WEIGHT: 270 LBS

## 2022-10-18 DIAGNOSIS — M51.26 HERNIATED NUCLEUS PULPOSUS, L4-5 LEFT: Primary | ICD-10-CM

## 2022-10-18 PROCEDURE — 99213 OFFICE O/P EST LOW 20 MIN: CPT | Performed by: PHYSICIAN ASSISTANT

## 2022-10-18 NOTE — PROGRESS NOTES
Follow-up: LUMBAR SPINE    Referring Provider:  No ref. provider found    CHIEF COMPLAINT:    Chief Complaint   Patient presents with    Back Pain     lumbar       HISTORY OF PRESENT ILLNESS:       Mr. Amelia Inman  is a pleasant 50 y.o. male, referred by his primary care provider Fabio Silver CNP, here for follow-up regarding his LBP and left leg pain and to review his lumbar MRI. Lynnette Avendano He states his pain began insidiously about 1 year ago. His pain has steadily continued since then. He rates his back pain 6/10, left buttock pain 6/10, left posterior leg pain 6/10. Lynnette Avendano He describes the pain as intermittent to constant. Pain is worse with prolonged standing and walking and improved some with sitting, changing positions, lying down. The leg pain radiates down the posterior aspect of his left leg to his foot. He has numbness and tingling in his left leg to his foot. He has a sense of weakness of his left leg and denies bowel or bladder dysfunction. The pain minimally disrupts his sleep.    Pain Assessment  Location of Pain: Back  Severity of Pain: 5  Quality of Pain: Other (Comment)  Duration of Pain: Other (Comment)  Frequency of Pain: Other (Comment)]    Current/Past Treatment:   Physical Therapy: 8 weeks of physical therapy with minimal benefit  Chiropractic: 3 to 4 weeks with some benefit  Injection: None  Medications: None    Past Medical History:   Past Medical History:   Diagnosis Date    Enlarged heart     by cath 2011//dur to atenolol 25???//    Hyperlipidemia     Hypertension     Thyroid disease     pt states that his most rescent blood showed that he does not require medication        Past Surgical History:     Past Surgical History:   Procedure Laterality Date    HERNIA REPAIR      SHOULDER ARTHROSCOPY         Current Medications:     Current Outpatient Medications:     methocarbamol (ROBAXIN) 500 MG tablet, Take 500 mg by mouth, Disp: , Rfl:     gabapentin (NEURONTIN) 300 MG capsule, Take 1 capsule by mouth 3 times daily for 30 days. Intended supply: 30 days, Disp: 90 capsule, Rfl: 0    ergocalciferol (ERGOCALCIFEROL) 1.25 MG (12274 UT) capsule, Take by mouth, Disp: , Rfl:     lisinopril-hydroCHLOROthiazide (PRINZIDE;ZESTORETIC) 20-12.5 MG per tablet, Take 1 tablet by mouth daily, Disp: 30 tablet, Rfl: 9    pravastatin (PRAVACHOL) 20 MG tablet, Take 20 mg by mouth daily Pt is unsure of the Mg, Disp: , Rfl:     nitroGLYCERIN (NITROSTAT) 0.4 MG SL tablet, Place 1 tablet under the tongue every 5 minutes as needed for Chest pain (may repeat 2 times only). , Disp: 25 tablet, Rfl: 3    aspirin EC 81 MG EC tablet, Take 1 tablet by mouth daily. , Disp: 30 tablet, Rfl: 3    Allergies:  Atenolol    Social History:    reports that he has never smoked. He has never used smokeless tobacco. He reports current alcohol use. He reports that he does not use drugs. Family History:   Family History   Problem Relation Age of Onset    Emphysema Mother     Sleep Apnea Sister     Cancer Sister 48        lymphoma    Cancer Maternal Aunt         lung    Cancer Maternal Grandmother         stomach cancer    Asthma Neg Hx     Diabetes Neg Hx     Heart Failure Neg Hx     Hypertension Neg Hx        REVIEW OF SYSTEMS: Full ROS noted & scanned   CONSTITUTIONAL: Denies unexplained weight loss, fevers, chills or fatigue  NEUROLOGICAL: Denies unsteady gait or progressive weakness  MUSCULOSKELETAL: Denies joint swelling or redness  PSYCHOLOGICAL: Denies anxiety, depression   SKIN: Denies skin changes, delayed healing, rash, itching   HEMATOLOGIC: Denies easy bleeding or bruising  ENDOCRINE: Denies excessive thirst, urination, heat/cold  RESPIRATORY: Denies current dyspnea, cough  GI: Denies nausea, vomiting, diarrhea   : Denies bowel or bladder issues      PHYSICAL EXAM:    Vitals: Height 5' 10\" (1.778 m), weight 270 lb (122.5 kg). GENERAL EXAM:  General Apparence: Patient is adequately groomed with no evidence of malnutrition.   Orientation: The patient is oriented to time, place and person. Mood & Affect:The patient's mood and affect are appropriate. Vascular: Examination reveals no swelling tenderness in upper or lower extremities. Good capillary refill. Lymphatic: The lymphatic examination bilaterally reveals all areas to be without enlargement or induration  Sensation: Sensation is intact without deficit  Coordination/Balance: Good coordination. LUMBAR/SACRAL EXAMINATION:  Inspection: Local inspection shows no step-off or bruising. Lumbar alignment is normal.  Sagittal and Coronal balance is neutral.      Palpation:   No evidence of tenderness at the midline. No tenderness bilaterally at the paraspinal or trochanters. There is no step-off or paraspinal spasm. Range of Motion: Lumbar flexion, extension and rotation are mildly limited due to pain. Strength:   Strength testing is 5/5 in all muscle groups tested. Special Tests:   Straight leg raise and crossed SLR negative. Leg length and pelvis level. Skin: There are no rashes, ulcerations or lesions. Reflexes: Reflexes are symmetrically 2+ at the patellar and ankle tendons. Clonus absent bilaterally at the feet. Gait & station: normal, patient ambulates without assistance    Additional Examinations:   RIGHT LOWER EXTREMITY: Inspection/examination of the right lower extremity does not show any tenderness, deformity or injury. Range of motion is unremarkable. There is no gross instability. There are no rashes, ulcerations or lesions. Strength and tone are normal.  LEFT LOWER EXTREMITY:  Inspection/examination of the left lower extremity does not show any tenderness, deformity or injury. Range of motion is unremarkable. There is no gross instability. There are no rashes, ulcerations or lesions.   Strength and tone are normal.    Diagnostic Testing:    MRI: MRI that was obtained on 10/11/2022 was independently reviewed with the patient which shows disc extrusion measuring 4 to 5 mm inferiorly at L4-5 towards the right causing severe narrowing of the right neuroforamen and mild stenosis of the thecal sac. There is severe narrowing of the left neural foramen. There is mild facet ligamentum flavum hypertrophy noted L L5-S1 with a broad-based disc bulge measuring 3 to 4 mm. Thecal sac and the S1 nerve roots are intact and there is moderate narrowing of the neuroforamina bilaterally. The left neural foramen is narrowed greater than the right. Impression:   L4-5 disc extrusion creating severe neuroforaminal narrowing  L5-S1 broad disc bulge with neuroforaminal narrowing left greater than right      Plan:      We discussed the diagnosis and treatment options including observation, additional oral steroids, physical therapy, epidural injections and additional imaging. He wishes to proceed with referral to Dr. Scott Fernandez for his evaluation care and left L4-5 ISADORA. He was also given a refill of his gabapentin to be taken 3 times a day. If he finds that he has had no durable benefit from the injection and gabapentin he will follow-up with Dr. Julissa Flores for his continue evaluation care. Follow up -with Dr. Julissa Flores as needed    Old records were reviewed.     Total evaluation time 23 minutes    Leia Patel PA-C  Board certified by the [address]

## 2022-10-19 NOTE — TELEPHONE ENCOUNTER
Spoke to [place] at [facility] who stated that orders were received and are waiting on a prior auth to come back.

## 2022-10-25 RX ORDER — GABAPENTIN 300 MG/1
300 CAPSULE ORAL 3 TIMES DAILY
Qty: 90 CAPSULE | Refills: 0 | Status: SHIPPED | OUTPATIENT
Start: 2022-10-25 | End: 2022-11-24

## 2022-10-25 RX ORDER — GABAPENTIN 300 MG/1
CAPSULE ORAL
Qty: 90 CAPSULE | Refills: 0 | OUTPATIENT
Start: 2022-10-25

## 2022-10-25 NOTE — TELEPHONE ENCOUNTER
Refill request for gabapentin medication.      Name of [address] [place] [address] [employer]      Last visit - 10/18/22     Pending visit - Nothing scheduled    Last refill -9/20/22    Additional Comments Med pended if you are agreeable

## 2023-01-09 ENCOUNTER — TELEPHONE (OUTPATIENT)
Dept: PULMONOLOGY | Age: 53
End: 2023-01-09

## 2023-01-09 ENCOUNTER — OFFICE VISIT (OUTPATIENT)
Dept: PULMONOLOGY | Age: 53
End: 2023-01-09
Payer: COMMERCIAL

## 2023-01-09 VITALS
WEIGHT: 284 LBS | HEART RATE: 60 BPM | BODY MASS INDEX: 40.66 KG/M2 | RESPIRATION RATE: 16 BRPM | OXYGEN SATURATION: 96 % | HEIGHT: 70 IN | DIASTOLIC BLOOD PRESSURE: 70 MMHG | SYSTOLIC BLOOD PRESSURE: 128 MMHG

## 2023-01-09 DIAGNOSIS — G47.33 SEVERE OBSTRUCTIVE SLEEP APNEA: Primary | ICD-10-CM

## 2023-01-09 PROBLEM — G47.19 EXCESSIVE DAYTIME SLEEPINESS: Status: RESOLVED | Noted: 2022-10-12 | Resolved: 2023-01-09

## 2023-01-09 PROCEDURE — 99213 OFFICE O/P EST LOW 20 MIN: CPT

## 2023-01-09 PROCEDURE — 3074F SYST BP LT 130 MM HG: CPT

## 2023-01-09 PROCEDURE — 3078F DIAST BP <80 MM HG: CPT

## 2023-01-09 ASSESSMENT — SLEEP AND FATIGUE QUESTIONNAIRES
HOW LIKELY ARE YOU TO NOD OFF OR FALL ASLEEP WHILE SITTING AND TALKING TO SOMEONE: 0
HOW LIKELY ARE YOU TO NOD OFF OR FALL ASLEEP WHILE LYING DOWN TO REST IN THE AFTERNOON WHEN CIRCUMSTANCES PERMIT: 2
HOW LIKELY ARE YOU TO NOD OFF OR FALL ASLEEP WHILE SITTING QUIETLY AFTER LUNCH WITHOUT ALCOHOL: 0
NECK CIRCUMFERENCE (INCHES): 18
ESS TOTAL SCORE: 8
HOW LIKELY ARE YOU TO NOD OFF OR FALL ASLEEP WHILE SITTING INACTIVE IN A PUBLIC PLACE: 1
HOW LIKELY ARE YOU TO NOD OFF OR FALL ASLEEP WHILE SITTING AND READING: 0
HOW LIKELY ARE YOU TO NOD OFF OR FALL ASLEEP IN A CAR, WHILE STOPPED FOR A FEW MINUTES IN TRAFFIC: 0
HOW LIKELY ARE YOU TO NOD OFF OR FALL ASLEEP WHEN YOU ARE A PASSENGER IN A CAR FOR AN HOUR WITHOUT A BREAK: 3
HOW LIKELY ARE YOU TO NOD OFF OR FALL ASLEEP WHILE WATCHING TV: 2

## 2023-01-09 NOTE — PROGRESS NOTES
PULMONARY, CRITICAL CARE AND SLEEP MEDICINE   Outpatient Sleep Progress Note   CC: Snoring, RAO  Referring Provider: Alla Mireles, MELO. Former Dr. Jann Mcallister pt. Interval History 1/9/23: 31-90  -  Had titration 10/11/22 recommending BiPAP 18/14, set up with ResMed BiPAP on 10/27/22.    -  RAO treated with benefit with BiPAP 18/4; used 8:36 hrs avg with compliance >4 hour use 30/30 days, residual AHI 2.5. [place] is: 8. Says he sleeps about 5 hours per night, even though wears BiPAP for 8.5 hours. Sleeps well the first 3-4 hours but then sleep is interrupted with frequent tossing/turning thereafter, he rarely looks at his watch but it tells him he does not get much restful sleep. Does feel his daytime sleepiness is improved, no napping during the day. Rarely takes Amitriptyline, does not think 25 mg interferes with EMS calls in middle of night. 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 Orders were placed for CPAP to [place] and pt was subsequently lost to follow up but he has been using CPAP therapy ever since. Now presents with a 1.5 year history of moderate daytime sleepiness, not improved with more sleep, and associated with frequently interrupted & restless sleep. States his smart watch tells him he only gets about 2-3 hours of deep sleep per night. Inititially CPAP made a \"night and day difference,\" but is now concerned that treatment needs to be re-checked. His treatment was never evaluated after set up. No significant weight change since starting CPAP. Does have difficulty breathing out against high pressures. [place] is 2. No car wrecks or near wrecks because of the sleepiness. No nodding off while driving. Reports no significant weight fluctuation in last 5 yrs.   Was never diagnosed with abnormal rhythm & has been evaluated by cardiology with [facility] 6/2021. + history of HTN. Never smoker. Has a [employer] that is 9years old. Does not have machine for download. Presenting HPI Dr. Nicky Ayala 7/1/15: 6 mo h/o severe snoring a/w EDS & observed apneas. 15 lb weight gain as exacerbating factor. [place] 15. reports that he has never smoked. He has never used smokeless tobacco.    PHYSICAL EXAM:  Blood pressure 128/70, pulse 60, resp. rate 16, height 5' 10\" (1.778 m), weight 284 lb (128.8 kg), SpO2 96 %.'   261# Jul 2015; 279# Aug 2022; 284# Jan 2023;   Constitutional:  No acute distress. Pleasant. HENT:  Oropharynx is clear and moist. No thyromegaly. Mallampati class III airway with very hypertrophied bilateral soft tissue structures. Eyes:  Conjunctivae are normal. Pupils equal, round, and reactive to light. No scleral icterus. Neck:  No tracheal deviation present. No obvious thyroid mass. Circumference is 18 inches. CV:  Normal rate, regular rhythm, normal heart sounds. No lower extremity edema. Pulm/Chest:  Clear breath sounds. No wheezes, rales or rhonchi. No accessory muscle usage or stridor. Moves air well. Abdominal:  Soft. No distension or obvious hernia. No tenderness or guarding. Musculoskeletal:  No cyanosis. No clubbing. No obvious joint deformity. Skin:  Skin is warm and dry. No rash or nodules on the exposed extremities. Psychiatric:  Normal mood and affect. Behavior is normal.    Neurological:  Alert, awake and oriented. No obvious cranial nerve deficits. Speech fluent    DATA:  Echo 6/2021: EF 55%. Mild concentric LVH. Normal LV diastolic filling pressure. SPAP 26 mmHg. PSG 7/8/2015: AHI 71     Titration 8/3/2015: APAP 16-22. SpO2 april of 55% with 57% of night spent <89%. Frequent pulse rate variability with >101 episodes of >6 BPM variation per hour. Irregularity of the pulse rate signals possible cardiac dysrhythmia. Titration 10/11/22: Recommend BiPAP 18/14. .5 SE 86.6%. SL 7 min.   CPAP intolerance. No significant PLMS. ASSESSMENT:  Severe RAO, AHI 71 on HST  Chronic fatigue on BiPAP - improved but not to goal   Frequent nocturnal awakenings x1.5 yrs - ongoing   Mild CAD s/p [facility] 6/2021 f/b Dr. Marylu Rosales   Comorbid conditions: Obesity, HTN, HLD  Never smoker    PLAN:   BiPAP 18/14 cmH2O, ResMed card only set up 10/27/22 (Prior romero APAP 16-22 cm H2O)  Taking Amitriptyline PRN from PCP - feels 25 mg not enough and 50 causes grogginess so going to attempt 1.5 tabs  Sleep hygiene & CPAP cleaning tips provided  Patient has been advised: no driving while sleepy; weight loss recommended. Pathophysiology & complications of untreated RAO have been discussed. Systemic benefits of CPAP therapy have been discussed. F/U in 1 year      Total time spent on this visit was 23 minutes; this includes review of prior notes and/or studies, direct patient contact, and coordination of care.

## 2023-01-09 NOTE — PATIENT INSTRUCTIONS
Great to see you again and take care [address]      Never drive a car or operate a motorized vehicle while drowsy or sleepy. Sleep Hygiene. .. Important practices for better sleep:    Avoid naps. This will ensure you are sleepy at bedtime. If you have to take a nap, sleep less than 1 hour, before 3 pm.  SCHEDULE: Have a fixed bedtime and awakening time. The human body thrives on routines. Only deviate from these set sleep times about 1-2 hours on the weekends (more than this will start altering your internal clock). You will feel better keeping a regular sleep cycle and giving your body a dependable pattern, even (especially) if you are retired or not working. Use light to train your biological clock: When you get up in the morning, exposure yourself to bright lights. When preparing for bed, dim the lights and avoid exposure to screens. The blue light from electronic screens tells our brain that it is time to be awake; it inhibits melatonin production which stops our brain from helping us get to sleep. Go to bed only when sleepy; this reduces the time you are awake in bed (which can lead to frustration and negative thoughts about sleep). If you can't fall asleep within 15-30 minutes, get up and do something boring until you feel sleepy again. Sit quietly in the dark or read the warranty on your refrigerator. Don't expose yourself to bright light during this time (especially screens), which would cue your brain that it is time to wake up. Regular exercise is recommended to help you deepen your sleep (and MANY other reasons), but timing is important--aim to exercise in the morning or early afternoon, not within 4-6 hours of your bedtime. Only use your bed for sleeping & intimacy. Do not use your bed as an office, workroom or recreation room. Let your mind/body \"know\" that the bed is associated with sleeping. Develop sleep rituals. Give your body clues it is time to slow down and sleep.  Dim the lights and turn off all screens! Examples include; yoga, deep breathing, listen to relaxing music, a cup of caffeine-free hot tea, a hot bath or a few minutes of reading (in dim light). A hot bath ~90 mins before bed will raise your body temperature, but it is the drop in body temperature that can help you feel sleepy. Avoid heavy, spicy or sugary foods 4-6 hours before bedtime   Stay away from stimulants such as caffeine and nicotine for at least 4-6 hours before bed. Stimulants can interfere with your ability to fall asleep. Caffeine is found in tea, cola, coffee, cocoa and chocolate. Nicotine is found in tobacco products. Avoid alcohol 4-6 hours before bedtime. Alcohol has an immediate sleep-inducing effect, but after a few hours when alcohol levels fall there is a stimulant or \"wake-up\" effect and will cause fragmented sleep. Dont take worries to bed. Leave worries about life, work, school etc. behind you when you go to bed. Some people find it helpful to assign a worry period in the evening or late afternoon to talk or write down the worries and get them out of your system. Ensure your bedroom is quiet and comfortable. A cooler room along with enough blankets to stay warm is recommended. If your room is too noisy, try a white noise machine. If too bright, try black out shades or an eye mask. Uncomfortable bedding can prevent good sleep. Evaluate whether or not this is a source of your problem, and make appropriate changes.     CPAP Equipment Cleaning and Disinfecting Schedule  Equipment Cleaning Frequency Instructions  Disinfecting Frequency   Non-Disposable Filters  Weekly Mild soapy water, Rinse, Air Dry Not Required   Disposable Filters Change as needed  2-4 weeks Do Not Wash Not Required   Hose/tubing Daily Mild soapy water, Rinse, Air Dry Once a week   Mask / Nasal Pillows Daily Mild soapy water, Rinse, Air Dry Once a week   Headgear Weekly Hand wash, Mild soapy water, Rinse, Dry  Not Required   Humidifier Daily Empty water daily  Mild soapy water, Rinse well, Air Dry  Once a week   CPAP Unit As Needed Dust with damp cloth,  No detergents or sprays Not Required         Disinfect (per schedule) with 1 part white vinegar and 3 parts water- soak mask and water chamber for 30 minutes every 1-2 weeks, more often if sick. Allow water/vinegar mixture to run through tubing. Allow all equipment to air dry. Drying Hints:   Always hang tubing away from direct sunlight, as this will cause the tubing to become yellow, brittle and crack over a period of time. DO NOT attach the wet tubing to your CPAP unit to blow-dry it. The moisture from the tubing can drain back into your machine. Moisture in your unit can cause sudden pressure increases or short circuits  DO's and DON'Ts:  - Don't use alcohol-based products to clean your mask, because it can cause the materials to become hard and brittle. - Don't put headgear in the washer or dryer  - Don't use any caustic or household cleaning solutions such as bleach on your CPAP   equipment.  - Do follow the recommended cleaning schedule. - Do change your disposable filter frequently. Adapted From: [email or web address]/cleaning. shtm.   These are general suggestions for all models please follow specific s recommendations and specific instructions

## 2024-01-05 ENCOUNTER — TELEPHONE (OUTPATIENT)
Dept: PULMONOLOGY | Age: 54
End: 2024-01-05

## 2024-01-05 ENCOUNTER — OFFICE VISIT (OUTPATIENT)
Dept: PULMONOLOGY | Age: 54
End: 2024-01-05
Payer: COMMERCIAL

## 2024-01-05 VITALS
RESPIRATION RATE: 18 BRPM | HEIGHT: 70 IN | OXYGEN SATURATION: 94 % | DIASTOLIC BLOOD PRESSURE: 60 MMHG | HEART RATE: 71 BPM | BODY MASS INDEX: 40.74 KG/M2 | WEIGHT: 284.6 LBS | SYSTOLIC BLOOD PRESSURE: 100 MMHG

## 2024-01-05 DIAGNOSIS — G47.33 SEVERE OBSTRUCTIVE SLEEP APNEA: Primary | ICD-10-CM

## 2024-01-05 DIAGNOSIS — R53.82 CHRONIC FATIGUE: Chronic | ICD-10-CM

## 2024-01-05 PROCEDURE — G8417 CALC BMI ABV UP PARAM F/U: HCPCS

## 2024-01-05 PROCEDURE — 99213 OFFICE O/P EST LOW 20 MIN: CPT

## 2024-01-05 PROCEDURE — 3078F DIAST BP <80 MM HG: CPT

## 2024-01-05 PROCEDURE — 1036F TOBACCO NON-USER: CPT

## 2024-01-05 PROCEDURE — 3017F COLORECTAL CA SCREEN DOC REV: CPT

## 2024-01-05 PROCEDURE — 3074F SYST BP LT 130 MM HG: CPT

## 2024-01-05 PROCEDURE — G8484 FLU IMMUNIZE NO ADMIN: HCPCS

## 2024-01-05 PROCEDURE — G8427 DOCREV CUR MEDS BY ELIG CLIN: HCPCS

## 2024-01-05 ASSESSMENT — SLEEP AND FATIGUE QUESTIONNAIRES
HOW LIKELY ARE YOU TO NOD OFF OR FALL ASLEEP WHILE SITTING QUIETLY AFTER LUNCH WITHOUT ALCOHOL: 1
HOW LIKELY ARE YOU TO NOD OFF OR FALL ASLEEP IN A CAR, WHILE STOPPED FOR A FEW MINUTES IN TRAFFIC: 0
HOW LIKELY ARE YOU TO NOD OFF OR FALL ASLEEP WHILE WATCHING TV: 2
ESS TOTAL SCORE: 6
HOW LIKELY ARE YOU TO NOD OFF OR FALL ASLEEP WHILE LYING DOWN TO REST IN THE AFTERNOON WHEN CIRCUMSTANCES PERMIT: 1
HOW LIKELY ARE YOU TO NOD OFF OR FALL ASLEEP WHILE SITTING AND READING: 1
HOW LIKELY ARE YOU TO NOD OFF OR FALL ASLEEP WHILE SITTING AND TALKING TO SOMEONE: 0
HOW LIKELY ARE YOU TO NOD OFF OR FALL ASLEEP WHEN YOU ARE A PASSENGER IN A CAR FOR AN HOUR WITHOUT A BREAK: 1
HOW LIKELY ARE YOU TO NOD OFF OR FALL ASLEEP WHILE SITTING INACTIVE IN A PUBLIC PLACE: 0
NECK CIRCUMFERENCE (INCHES): 18

## 2024-01-05 NOTE — PROGRESS NOTES
Titration 8/3/2015: APAP 16-22.  SpO2 april of 55% with 57% of night spent <89%.  Frequent pulse rate variability with >101 episodes of >6 BPM variation per hour.  Irregularity of the pulse rate signals possible cardiac dysrhythmia.     Titration 10/11/22: Recommend BiPAP 18/14.  .5 SE 86.6%.  SL 7 min.  CPAP intolerance.  No significant PLMS.     ASSESSMENT:  Severe RAO, AHI 71 on HST  Chronic fatigue - improved on BiPAP but not to goal   Frequent nocturnal awakenings/sleep maintenance insomnia x3 yrs - ongoing   Mild CAD s/p [facility] 6/2021 f/b Dr. Downs   Comorbid conditions: Obesity, HTN, HLD  Never smoker    PLAN:   BiPAP 18/14 cmH2O.  AirCurve 10 card only set up 10/27/22.  Supplies - [employer].  (Prior romero APAP 16-22)  Sleep restriction therapy recommended  Still taking Amitriptyline PRN from PCP - feels 25 mg not enough & 50 causes grogginess so going to continue attempting 1.5 tabs  Sleep hygiene & CPAP cleaning tips provided.  Supply schedule.    Patient has been advised: no driving while sleepy; weight loss recommended.  Pathophysiology & complications of untreated RAO have been discussed.  Systemic benefits of CPAP therapy have been discussed.   F/U in 1 year

## 2024-01-05 NOTE — TELEPHONE ENCOUNTER
Faxed orders for 3 masks to [facility] at [phone] via RightFax.

## 2024-05-13 ENCOUNTER — HOSPITAL ENCOUNTER (OUTPATIENT)
Age: 54
Discharge: HOME | End: 2024-05-13
Payer: COMMERCIAL

## 2024-05-13 ENCOUNTER — HOSPITAL ENCOUNTER (OUTPATIENT)
Dept: GENERAL RADIOLOGY | Age: 54
Discharge: HOME | End: 2024-05-13
Payer: COMMERCIAL

## 2024-05-13 DIAGNOSIS — M79.641 RIGHT HAND PAIN: ICD-10-CM

## 2024-05-13 PROCEDURE — 73130 X-RAY EXAM OF HAND: CPT

## 2024-09-03 ENCOUNTER — APPOINTMENT (OUTPATIENT)
Dept: GENERAL RADIOLOGY | Age: 54
End: 2024-09-03
Payer: COMMERCIAL

## 2024-09-03 ENCOUNTER — HOSPITAL ENCOUNTER (EMERGENCY)
Age: 54
Discharge: HOME | End: 2024-09-03
Attending: EMERGENCY MEDICINE
Payer: COMMERCIAL

## 2024-09-03 VITALS
RESPIRATION RATE: 18 BRPM | BODY MASS INDEX: 40.89 KG/M2 | TEMPERATURE: 97.8 F | HEART RATE: 81 BPM | OXYGEN SATURATION: 96 % | WEIGHT: 285 LBS | DIASTOLIC BLOOD PRESSURE: 88 MMHG | SYSTOLIC BLOOD PRESSURE: 145 MMHG

## 2024-09-03 DIAGNOSIS — M25.571 ACUTE RIGHT ANKLE PAIN: Primary | ICD-10-CM
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
## 2024-09-03 DIAGNOSIS — R03.0 ELEVATED BLOOD PRESSURE READING: ICD-10-CM

## 2024-09-03 PROCEDURE — 73610 X-RAY EXAM OF ANKLE: CPT

## 2024-09-03 PROCEDURE — 99283 EMERGENCY DEPT VISIT LOW MDM: CPT

## 2024-09-03 ASSESSMENT — ENCOUNTER SYMPTOMS
SHORTNESS OF BREATH: 0
NAUSEA: 0
VOMITING: 0
ABDOMINAL PAIN: 0
BACK PAIN: 0

## 2024-09-03 ASSESSMENT — PAIN SCALES - GENERAL: PAINLEVEL_OUTOF10: 5

## 2024-09-03 ASSESSMENT — PAIN DESCRIPTION - ORIENTATION: ORIENTATION: RIGHT

## 2024-09-03 ASSESSMENT — PAIN DESCRIPTION - LOCATION: LOCATION: ANKLE

## 2024-09-03 ASSESSMENT — PAIN - FUNCTIONAL ASSESSMENT: PAIN_FUNCTIONAL_ASSESSMENT: 0-10

## 2024-09-03 ASSESSMENT — PAIN DESCRIPTION - PAIN TYPE: TYPE: ACUTE PAIN

## 2024-09-03 NOTE — ED PROVIDER NOTES
[facility]  ED  EMERGENCY DEPARTMENT ENCOUNTER        Pt Name: Aly Batres  MRN: [id number]  Birthdate 1970  Date of evaluation: 9/3/2024  Provider: Eleno Rajput MD  PCP: Leta Mauro APRN - NP      CHIEF COMPLAINT       Chief Complaint   Patient presents with    Ankle Pain     Pt presents to the ER with the complaint of rt ankle pain after rolling it laterally. Pt has swelling to the area and good distal pms.        HISTORY OFPRESENT ILLNESS   (Location/Symptom, Timing/Onset, Context/Setting, Quality, Duration, Modifying Factors,Severity)  Note limiting factors.     Aly Batres is a 54 y.o. male presenting today due to concern for trying to work on the fire hose today while at work and subsequently stepping on a rock that caused him to twist his right ankle and having significant right lateral ankle pain ever since.  He does state that with the twisting of the ankle he did briefly fall to the ground but denies hitting his head or passing out.  He denies any headache or neck pain.  He is an EMT.  He denies any chest pain or shortness of breath.  No reported abdominal pain.  He denies any knee or hip pain bilaterally.  He denies any foot pain.  He denies any new numbness or weakness in the feet.  Other than concern for acute right lateral ankle discomfort after twisting it today, he has no other concerns at this time.  He does not want anything for pain at this time.  He does report that he fractured the right ankle in the past.  He does report having access to crutches at home if needed.        REVIEW OF SYSTEMS    (2-9 systems for level 4, 10 or more for level 5)     Review of Systems   Constitutional:  Negative for chills and fever.   Respiratory:  Negative for shortness of breath.    Cardiovascular:  Negative for chest pain.   Gastrointestinal:  Negative for abdominal pain, nausea and vomiting.   Musculoskeletal:  Positive for arthralgias (Right lateral ankle only),

## 2024-09-03 NOTE — DISCHARGE INSTRUCTIONS
Take Tylenol or ibuprofen as needed for pain.  Use ice for swelling.    Follow-up with your primary doctor or occupational health over the next week for further evaluation.    If you have persistent discomfort over the next 2 to 3 weeks, follow-up with orthopedics in case she need MRI or further evaluation.    Return to the emergency department for any worsening pain with new numbness or weakness in the foot, inability to walk, or any other concerns.

## 2025-01-09 ENCOUNTER — TELEPHONE (OUTPATIENT)
Dept: PULMONOLOGY | Age: 55
End: 2025-01-09

## 2025-01-09 ENCOUNTER — OFFICE VISIT (OUTPATIENT)
Age: 55
End: 2025-01-09
Payer: COMMERCIAL

## 2025-01-09 VITALS
RESPIRATION RATE: 20 BRPM | WEIGHT: 299 LBS | HEIGHT: 71 IN | DIASTOLIC BLOOD PRESSURE: 80 MMHG | BODY MASS INDEX: 41.86 KG/M2 | OXYGEN SATURATION: 94 % | SYSTOLIC BLOOD PRESSURE: 130 MMHG | HEART RATE: 70 BPM

## 2025-01-09 DIAGNOSIS — G47.33 SEVERE OBSTRUCTIVE SLEEP APNEA: Primary | Chronic | ICD-10-CM

## 2025-01-09 DIAGNOSIS — G47.00 INSOMNIA, UNSPECIFIED TYPE: ICD-10-CM

## 2025-01-09 PROCEDURE — 99214 OFFICE O/P EST MOD 30 MIN: CPT

## 2025-01-09 PROCEDURE — 3075F SYST BP GE 130 - 139MM HG: CPT

## 2025-01-09 PROCEDURE — G8427 DOCREV CUR MEDS BY ELIG CLIN: HCPCS

## 2025-01-09 PROCEDURE — 3079F DIAST BP 80-89 MM HG: CPT

## 2025-01-09 PROCEDURE — G8417 CALC BMI ABV UP PARAM F/U: HCPCS

## 2025-01-09 PROCEDURE — 3017F COLORECTAL CA SCREEN DOC REV: CPT

## 2025-01-09 PROCEDURE — 1036F TOBACCO NON-USER: CPT

## 2025-01-09 RX ORDER — ASCORBIC ACID 500 MG
500 TABLET ORAL DAILY
COMMUNITY

## 2025-01-09 ASSESSMENT — SLEEP AND FATIGUE QUESTIONNAIRES
HOW LIKELY ARE YOU TO NOD OFF OR FALL ASLEEP WHILE SITTING AND READING: SLIGHT CHANCE OF DOZING
HOW LIKELY ARE YOU TO NOD OFF OR FALL ASLEEP WHEN YOU ARE A PASSENGER IN A CAR FOR AN HOUR WITHOUT A BREAK: MODERATE CHANCE OF DOZING
HOW LIKELY ARE YOU TO NOD OFF OR FALL ASLEEP IN A CAR, WHILE STOPPED FOR A FEW MINUTES IN TRAFFIC: WOULD NEVER DOZE
ESS TOTAL SCORE: 6
HOW LIKELY ARE YOU TO NOD OFF OR FALL ASLEEP WHILE WATCHING TV: MODERATE CHANCE OF DOZING
HOW LIKELY ARE YOU TO NOD OFF OR FALL ASLEEP WHILE SITTING INACTIVE IN A PUBLIC PLACE: WOULD NEVER DOZE
HOW LIKELY ARE YOU TO NOD OFF OR FALL ASLEEP WHILE LYING DOWN TO REST IN THE AFTERNOON WHEN CIRCUMSTANCES PERMIT: WOULD NEVER DOZE
HOW LIKELY ARE YOU TO NOD OFF OR FALL ASLEEP WHILE SITTING AND TALKING TO SOMEONE: WOULD NEVER DOZE
HOW LIKELY ARE YOU TO NOD OFF OR FALL ASLEEP WHILE SITTING QUIETLY AFTER LUNCH WITHOUT ALCOHOL: SLIGHT CHANCE OF DOZING

## 2025-01-09 NOTE — PATIENT INSTRUCTIONS
For insomnia:   Consider reading \"Say Santhosh To Insomnia\"   Consider the online program:  CbtForInsomnia.com   Download mobile jeni: \"CBTi \" and look at the tools section   Look through the CBTi sessions from us today,  sleep log is being provided that is optional for you to utilize     Other mask options:   ResMed AirTouch F20  (memory foam)   L[employer] Cassandra [employer]uro Full face   Consider a chin strap       It was great to see you again and take care Aly!  -Jeannine      Never drive a car or operate a motorized vehicle while drowsy or sleepy.       Sleep Hygiene... Important practices for better sleep:  Avoid naps. This will ensure you are sleepy at bedtime.  If you have to take a nap, sleep 30 minutes or less before 3 pm.  Have a fixed bedtime and awakening time. The human body thrives on routines. Only deviate from these set times by no more than 1 hour, including on weekends.  Avoid exposure to electronics/screens within 2 hours of your desired sleep time. Light from screens inhibits melatonin production which stops our brain from helping us get to sleep.   Go to bed only when sleepy; this reduces the time you are awake in bed (which can lead to frustration and negative thoughts about sleep). If you can't fall asleep within 15-30 minutes, get up and do something boring until you feel sleepy again. Absolutely no electronic screens during this time.    Only use your bed for sleeping & intimacy. Do not use your bed as an office, workroom or recreation room.    Develop sleep rituals that you go through the same way every night.  Stay away from stimulants such as caffeine and nicotine. Caffeine can remain in your system for well over 10 hours, so no caffeine after lunch. Caffeine is found in tea, cola, coffee, cocoa and chocolate.    Avoid alcohol 2-4 hours before bedtime. As alcohol is metabolized, the result is a stimulant or \"wake-up\" effect and will cause fragmented sleep.   Regular exercise is

## 2025-01-09 NOTE — PROGRESS NOTES
MA Communication:  The following orders are received by verbal communication from Jeannine Leon PA-C.     Orders include:    Session 1 and 2 and sleep log given   1 year f/u   Supplies     
1 year

## 2025-07-25 ENCOUNTER — HOSPITAL ENCOUNTER (EMERGENCY)
Age: 55
Discharge: HOME | End: 2025-07-25
Payer: COMMERCIAL

## 2025-07-25 ENCOUNTER — APPOINTMENT (OUTPATIENT)
Dept: CT IMAGING | Age: 55
End: 2025-07-25
Payer: COMMERCIAL

## 2025-07-25 VITALS
OXYGEN SATURATION: 97 % | RESPIRATION RATE: 18 BRPM | SYSTOLIC BLOOD PRESSURE: 121 MMHG | BODY MASS INDEX: 40.97 KG/M2 | WEIGHT: 286.2 LBS | HEIGHT: 70 IN | HEART RATE: 58 BPM | TEMPERATURE: 98.2 F | DIASTOLIC BLOOD PRESSURE: 87 MMHG

## 2025-07-25 DIAGNOSIS — R10.9 RIGHT FLANK PAIN: Primary | ICD-10-CM

## 2025-07-25 LAB
ALBUMIN SERPL-MCNC: 3.9 G/DL (ref 3.4–5)
ALBUMIN/GLOB SERPL: 1.3 {RATIO} (ref 1.1–2.2)
ALP SERPL-CCNC: 63 U/L (ref 40–129)
ALT SERPL-CCNC: 32 U/L (ref 10–40)
ANION GAP SERPL CALCULATED.3IONS-SCNC: 9 MMOL/L (ref 3–16)
AST SERPL-CCNC: 29 U/L (ref 15–37)
BACTERIA URNS QL MICRO: ABNORMAL /HPF
BASOPHILS # BLD: 0.1 K/UL (ref 0–0.2)
BASOPHILS NFR BLD: 0.8 %
BILIRUB SERPL-MCNC: 0.5 MG/DL (ref 0–1)
BILIRUB UR QL STRIP.AUTO: NEGATIVE
BUN SERPL-MCNC: 12 MG/DL (ref 7–20)
CALCIUM SERPL-MCNC: 9.1 MG/DL (ref 8.3–10.6)
CHLORIDE SERPL-SCNC: 98 MMOL/L (ref 99–110)
CLARITY UR: ABNORMAL
CO2 SERPL-SCNC: 26 MMOL/L (ref 21–32)
COLOR UR: ABNORMAL
CREAT SERPL-MCNC: 0.8 MG/DL (ref 0.9–1.3)
DEPRECATED RDW RBC AUTO: 13.6 % (ref 12.4–15.4)
EOSINOPHIL # BLD: 0.2 K/UL (ref 0–0.6)
EOSINOPHIL NFR BLD: 2.5 %
EPI CELLS #/AREA URNS HPF: ABNORMAL /HPF (ref 0–5)
GFR SERPLBLD CREATININE-BSD FMLA CKD-EPI: >90 ML/MIN/{1.73_M2}
GLUCOSE SERPL-MCNC: 78 MG/DL (ref 70–99)
GLUCOSE UR STRIP.AUTO-MCNC: NEGATIVE MG/DL
HCT VFR BLD AUTO: 42.3 % (ref 40.5–52.5)
HGB BLD-MCNC: 14.1 G/DL (ref 13.5–17.5)
HGB UR QL STRIP.AUTO: NEGATIVE
KETONES UR STRIP.AUTO-MCNC: NEGATIVE MG/DL
LEUKOCYTE ESTERASE UR QL STRIP.AUTO: ABNORMAL
LIPASE SERPL-CCNC: 33 U/L (ref 13–60)
LYMPHOCYTES # BLD: 2.5 K/UL (ref 1–5.1)
LYMPHOCYTES NFR BLD: 34.2 %
MAGNESIUM SERPL-MCNC: 1.89 MG/DL (ref 1.8–2.4)
MCH RBC QN AUTO: 29.4 PG (ref 26–34)
MCHC RBC AUTO-ENTMCNC: 33.3 G/DL (ref 31–36)
MCV RBC AUTO: 88.1 FL (ref 80–100)
MONOCYTES # BLD: 0.6 K/UL (ref 0–1.3)
MONOCYTES NFR BLD: 8.2 %
NEUTROPHILS # BLD: 3.9 K/UL (ref 1.7–7.7)
NEUTROPHILS NFR BLD: 54.3 %
NITRITE UR QL STRIP.AUTO: NEGATIVE
PH UR STRIP.AUTO: 6 [PH] (ref 5–8)
PLATELET # BLD AUTO: 167 K/UL (ref 135–450)
PMV BLD AUTO: 10.2 FL (ref 5–10.5)
POTASSIUM SERPL-SCNC: 3.5 MMOL/L (ref 3.5–5.1)
PROT SERPL-MCNC: 6.8 G/DL (ref 6.4–8.2)
PROT UR STRIP.AUTO-MCNC: NEGATIVE MG/DL
RBC # BLD AUTO: 4.8 M/UL (ref 4.2–5.9)
RBC #/AREA URNS HPF: ABNORMAL /HPF (ref 0–4)
SODIUM SERPL-SCNC: 133 MMOL/L (ref 136–145)
SP GR UR STRIP.AUTO: 1.01 (ref 1–1.03)
UA DIPSTICK W REFLEX MICRO PNL UR: YES
URN SPEC COLLECT METH UR: ABNORMAL
UROBILINOGEN UR STRIP-ACNC: 0.2 E.U./DL
WBC # BLD AUTO: 7.2 K/UL (ref 4–11)
WBC #/AREA URNS HPF: ABNORMAL /HPF (ref 0–5)

## 2025-07-25 PROCEDURE — 83735 ASSAY OF MAGNESIUM: CPT

## 2025-07-25 PROCEDURE — 6360000002 HC RX W HCPCS

## 2025-07-25 PROCEDURE — 83690 ASSAY OF LIPASE: CPT

## 2025-07-25 PROCEDURE — 74176 CT ABD & PELVIS W/O CONTRAST: CPT

## 2025-07-25 PROCEDURE — 80053 COMPREHEN METABOLIC PANEL: CPT

## 2025-07-25 PROCEDURE — 81001 URINALYSIS AUTO W/SCOPE: CPT

## 2025-07-25 PROCEDURE — 85025 COMPLETE CBC W/AUTO DIFF WBC: CPT

## 2025-07-25 PROCEDURE — 99284 EMERGENCY DEPT VISIT MOD MDM: CPT

## 2025-07-25 PROCEDURE — 96374 THER/PROPH/DIAG INJ IV PUSH: CPT

## 2025-07-25 RX ORDER — KETOROLAC TROMETHAMINE 15 MG/ML
15 INJECTION, SOLUTION INTRAMUSCULAR; INTRAVENOUS ONCE
Status: COMPLETED | OUTPATIENT
Start: 2025-07-25 | End: 2025-07-25

## 2025-07-25 RX ADMIN — KETOROLAC TROMETHAMINE 15 MG: 15 INJECTION, SOLUTION INTRAMUSCULAR; INTRAVENOUS at 14:32

## 2025-07-25 ASSESSMENT — PAIN SCALES - GENERAL
PAINLEVEL_OUTOF10: 0
PAINLEVEL_OUTOF10: 5

## 2025-07-25 NOTE — DISCHARGE INSTRUCTIONS
You were seen today for right-sided flank pain.  Your overall labs and imaging were reassuring.  You can take ibuprofen 600 mg every 6 hours and Tylenol 1000 mg every 8 hours as needed for symptom relief.  He can apply cold and warm compresses.  Please follow-up with your primary care provider within the next few days for reevaluation.  Return to this department for any new or worsening symptoms including uncontrolled pain, high-grade fevers, persistent vomiting

## 2025-07-25 NOTE — ED PROVIDER NOTES
[facility] EMERGENCY DEPARTMENT  EMERGENCY DEPARTMENT ENCOUNTER        Pt Name: Aly Batres  MRN: [id number]  Birthdate 1970  Date of evaluation: 7/25/2025  Provider: NILDA Rodas  PCP: Leta Mauro APRN - NP  Note Started: 2:04 PM EDT 7/25/25      MARTY. I have evaluated this patient.        CHIEF COMPLAINT       Chief Complaint   Patient presents with    Flank Pain     Pt reports right sided flank pain for past few days, pt reports no history       HISTORY OF PRESENT ILLNESS: 1 or more Elements     History From: Patient    Limitations to history : None    Social Determinants Significantly Affecting Health : None    Chief Complaint: Right flank pain    Aly Batres is a 55 y.o. male who presents to the emergency department for right sided flank pain.  States that it started on Wednesday.  This morning it started radiating across into his abdomen.  Does endorse associated dark urine with a strong odor.  He denies fevers, chills, abdominal pain, nausea, vomiting, history of kidney stones.  He denies recent injuries or trauma.  He denies numbness or weakness of extremities, bowel or bladder incontinence/retention.    Nursing Notes were all reviewed and agreed with or any disagreements were addressed in the HPI.    REVIEW OF SYSTEMS :      Review of Systems    Positives and Pertinent negatives as per HPI.     SURGICAL HISTORY     Past Surgical History:   Procedure Laterality Date    HERNIA REPAIR      SHOULDER ARTHROSCOPY         CURRENTMEDICATIONS       Discharge Medication List as of 7/25/2025  4:21 PM        CONTINUE these medications which have NOT CHANGED    Details   vitamin C (ASCORBIC ACID) 500 MG tablet Take 1 tablet by mouth dailyHistorical Med      ergocalciferol (ERGOCALCIFEROL) 1.25 MG (61488 UT) capsule Take by mouthHistorical Med      lisinopril-hydroCHLOROthiazide (PRINZIDE;ZESTORETIC) 20-12.5 MG per tablet Take 1 tablet by mouth daily, Disp-30 tablet, R-9Normal